# Patient Record
Sex: MALE | Race: WHITE | NOT HISPANIC OR LATINO
[De-identification: names, ages, dates, MRNs, and addresses within clinical notes are randomized per-mention and may not be internally consistent; named-entity substitution may affect disease eponyms.]

---

## 2017-05-09 ENCOUNTER — APPOINTMENT (OUTPATIENT)
Dept: PLASTIC SURGERY | Facility: CLINIC | Age: 77
End: 2017-05-09

## 2017-11-10 ENCOUNTER — OUTPATIENT (OUTPATIENT)
Dept: OUTPATIENT SERVICES | Facility: HOSPITAL | Age: 77
LOS: 1 days | Discharge: HOME | End: 2017-11-10

## 2017-11-10 DIAGNOSIS — D50.9 IRON DEFICIENCY ANEMIA, UNSPECIFIED: ICD-10-CM

## 2017-11-10 DIAGNOSIS — I25.10 ATHEROSCLEROTIC HEART DISEASE OF NATIVE CORONARY ARTERY WITHOUT ANGINA PECTORIS: ICD-10-CM

## 2017-11-10 DIAGNOSIS — Z79.899 OTHER LONG TERM (CURRENT) DRUG THERAPY: ICD-10-CM

## 2017-11-10 DIAGNOSIS — N39.0 URINARY TRACT INFECTION, SITE NOT SPECIFIED: ICD-10-CM

## 2017-11-10 DIAGNOSIS — E53.8 DEFICIENCY OF OTHER SPECIFIED B GROUP VITAMINS: ICD-10-CM

## 2017-11-10 DIAGNOSIS — E78.00 PURE HYPERCHOLESTEROLEMIA, UNSPECIFIED: ICD-10-CM

## 2017-11-10 DIAGNOSIS — D64.9 ANEMIA, UNSPECIFIED: ICD-10-CM

## 2017-11-10 DIAGNOSIS — E11.9 TYPE 2 DIABETES MELLITUS WITHOUT COMPLICATIONS: ICD-10-CM

## 2017-11-10 DIAGNOSIS — E78.5 HYPERLIPIDEMIA, UNSPECIFIED: ICD-10-CM

## 2017-11-10 DIAGNOSIS — Z01.810 ENCOUNTER FOR PREPROCEDURAL CARDIOVASCULAR EXAMINATION: ICD-10-CM

## 2018-04-17 ENCOUNTER — APPOINTMENT (OUTPATIENT)
Dept: UROLOGY | Facility: CLINIC | Age: 78
End: 2018-04-17
Payer: MEDICARE

## 2018-04-17 VITALS
SYSTOLIC BLOOD PRESSURE: 153 MMHG | BODY MASS INDEX: 25.18 KG/M2 | DIASTOLIC BLOOD PRESSURE: 49 MMHG | HEIGHT: 69 IN | WEIGHT: 170 LBS | HEART RATE: 49 BPM

## 2018-04-17 DIAGNOSIS — K22.70 BARRETT'S ESOPHAGUS W/OUT DYSPLASIA: ICD-10-CM

## 2018-04-17 DIAGNOSIS — Z87.891 PERSONAL HISTORY OF NICOTINE DEPENDENCE: ICD-10-CM

## 2018-04-17 DIAGNOSIS — Z78.9 OTHER SPECIFIED HEALTH STATUS: ICD-10-CM

## 2018-04-17 DIAGNOSIS — N13.8 BENIGN PROSTATIC HYPERPLASIA WITH LOWER URINARY TRACT SYMPMS: ICD-10-CM

## 2018-04-17 DIAGNOSIS — N20.0 CALCULUS OF KIDNEY: ICD-10-CM

## 2018-04-17 DIAGNOSIS — K21.9 GASTRO-ESOPHAGEAL REFLUX DISEASE W/OUT ESOPHAGITIS: ICD-10-CM

## 2018-04-17 DIAGNOSIS — N40.1 BENIGN PROSTATIC HYPERPLASIA WITH LOWER URINARY TRACT SYMPMS: ICD-10-CM

## 2018-04-17 LAB
BILIRUB UR QL STRIP: NORMAL
CLARITY UR: CLEAR
COLLECTION METHOD: NORMAL
GLUCOSE UR-MCNC: NORMAL
HCG UR QL: NORMAL EU/DL
HGB UR QL STRIP.AUTO: NORMAL
KETONES UR-MCNC: NORMAL
LEUKOCYTE ESTERASE UR QL STRIP: NORMAL
NITRITE UR QL STRIP: NORMAL
PH UR STRIP: 6
PROT UR STRIP-MCNC: NORMAL
SP GR UR STRIP: 1.01

## 2018-04-17 PROCEDURE — 81003 URINALYSIS AUTO W/O SCOPE: CPT | Mod: QW

## 2018-04-17 PROCEDURE — 99213 OFFICE O/P EST LOW 20 MIN: CPT

## 2018-05-08 ENCOUNTER — APPOINTMENT (OUTPATIENT)
Dept: PLASTIC SURGERY | Facility: CLINIC | Age: 78
End: 2018-05-08
Payer: MEDICARE

## 2018-05-08 DIAGNOSIS — C44.90 UNSPECIFIED MALIGNANT NEOPLASM OF SKIN, UNSPECIFIED: ICD-10-CM

## 2018-05-08 PROCEDURE — 99212 OFFICE O/P EST SF 10 MIN: CPT

## 2018-05-10 ENCOUNTER — OUTPATIENT (OUTPATIENT)
Dept: OUTPATIENT SERVICES | Facility: HOSPITAL | Age: 78
LOS: 1 days | Discharge: HOME | End: 2018-05-10

## 2018-05-10 DIAGNOSIS — D64.9 ANEMIA, UNSPECIFIED: ICD-10-CM

## 2018-05-10 DIAGNOSIS — I25.10 ATHEROSCLEROTIC HEART DISEASE OF NATIVE CORONARY ARTERY WITHOUT ANGINA PECTORIS: ICD-10-CM

## 2018-05-10 DIAGNOSIS — E11.9 TYPE 2 DIABETES MELLITUS WITHOUT COMPLICATIONS: ICD-10-CM

## 2018-05-10 DIAGNOSIS — D50.9 IRON DEFICIENCY ANEMIA, UNSPECIFIED: ICD-10-CM

## 2018-05-10 DIAGNOSIS — Z01.810 ENCOUNTER FOR PREPROCEDURAL CARDIOVASCULAR EXAMINATION: ICD-10-CM

## 2018-05-10 DIAGNOSIS — E53.8 DEFICIENCY OF OTHER SPECIFIED B GROUP VITAMINS: ICD-10-CM

## 2018-10-29 ENCOUNTER — OUTPATIENT (OUTPATIENT)
Dept: OUTPATIENT SERVICES | Facility: HOSPITAL | Age: 78
LOS: 1 days | Discharge: HOME | End: 2018-10-29

## 2018-10-29 DIAGNOSIS — D64.9 ANEMIA, UNSPECIFIED: ICD-10-CM

## 2018-10-29 DIAGNOSIS — E53.8 DEFICIENCY OF OTHER SPECIFIED B GROUP VITAMINS: ICD-10-CM

## 2018-10-29 DIAGNOSIS — R97.20 ELEVATED PROSTATE SPECIFIC ANTIGEN [PSA]: ICD-10-CM

## 2018-10-29 DIAGNOSIS — E05.90 THYROTOXICOSIS, UNSPECIFIED WITHOUT THYROTOXIC CRISIS OR STORM: ICD-10-CM

## 2018-10-29 DIAGNOSIS — N39.0 URINARY TRACT INFECTION, SITE NOT SPECIFIED: ICD-10-CM

## 2018-10-29 DIAGNOSIS — I25.10 ATHEROSCLEROTIC HEART DISEASE OF NATIVE CORONARY ARTERY WITHOUT ANGINA PECTORIS: ICD-10-CM

## 2018-10-29 DIAGNOSIS — E11.9 TYPE 2 DIABETES MELLITUS WITHOUT COMPLICATIONS: ICD-10-CM

## 2018-10-29 DIAGNOSIS — E78.00 PURE HYPERCHOLESTEROLEMIA, UNSPECIFIED: ICD-10-CM

## 2018-10-29 DIAGNOSIS — Z01.810 ENCOUNTER FOR PREPROCEDURAL CARDIOVASCULAR EXAMINATION: ICD-10-CM

## 2018-10-29 DIAGNOSIS — D50.9 IRON DEFICIENCY ANEMIA, UNSPECIFIED: ICD-10-CM

## 2018-10-29 DIAGNOSIS — Z79.899 OTHER LONG TERM (CURRENT) DRUG THERAPY: ICD-10-CM

## 2019-04-25 ENCOUNTER — OUTPATIENT (OUTPATIENT)
Dept: OUTPATIENT SERVICES | Facility: HOSPITAL | Age: 79
LOS: 1 days | Discharge: HOME | End: 2019-04-25

## 2019-04-25 DIAGNOSIS — D64.9 ANEMIA, UNSPECIFIED: ICD-10-CM

## 2019-04-25 DIAGNOSIS — D50.9 IRON DEFICIENCY ANEMIA, UNSPECIFIED: ICD-10-CM

## 2019-04-25 DIAGNOSIS — E11.9 TYPE 2 DIABETES MELLITUS WITHOUT COMPLICATIONS: ICD-10-CM

## 2019-04-25 DIAGNOSIS — I25.10 ATHEROSCLEROTIC HEART DISEASE OF NATIVE CORONARY ARTERY WITHOUT ANGINA PECTORIS: ICD-10-CM

## 2019-04-25 DIAGNOSIS — E05.90 THYROTOXICOSIS, UNSPECIFIED WITHOUT THYROTOXIC CRISIS OR STORM: ICD-10-CM

## 2019-04-25 DIAGNOSIS — Z01.810 ENCOUNTER FOR PREPROCEDURAL CARDIOVASCULAR EXAMINATION: ICD-10-CM

## 2019-04-25 DIAGNOSIS — E78.5 HYPERLIPIDEMIA, UNSPECIFIED: ICD-10-CM

## 2019-04-25 DIAGNOSIS — N39.0 URINARY TRACT INFECTION, SITE NOT SPECIFIED: ICD-10-CM

## 2019-04-25 DIAGNOSIS — E53.8 DEFICIENCY OF OTHER SPECIFIED B GROUP VITAMINS: ICD-10-CM

## 2019-04-29 DIAGNOSIS — I25.10 ATHEROSCLEROTIC HEART DISEASE OF NATIVE CORONARY ARTERY WITHOUT ANGINA PECTORIS: ICD-10-CM

## 2019-04-29 DIAGNOSIS — Z02.9 ENCOUNTER FOR ADMINISTRATIVE EXAMINATIONS, UNSPECIFIED: ICD-10-CM

## 2019-04-29 DIAGNOSIS — Z01.810 ENCOUNTER FOR PREPROCEDURAL CARDIOVASCULAR EXAMINATION: ICD-10-CM

## 2019-04-30 NOTE — ASU PATIENT PROFILE, ADULT - PMH
Acute glaucoma    Barretts esophagus    Basal cell carcinoma (BCC)    History of kidney stones    Well-controlled hypertension

## 2019-05-01 ENCOUNTER — OUTPATIENT (OUTPATIENT)
Dept: OUTPATIENT SERVICES | Facility: HOSPITAL | Age: 79
LOS: 1 days | Discharge: HOME | End: 2019-05-01

## 2019-05-01 VITALS
DIASTOLIC BLOOD PRESSURE: 70 MMHG | SYSTOLIC BLOOD PRESSURE: 162 MMHG | RESPIRATION RATE: 16 BRPM | WEIGHT: 169.98 LBS | HEART RATE: 51 BPM | OXYGEN SATURATION: 99 % | HEIGHT: 70 IN | TEMPERATURE: 96 F

## 2019-05-01 VITALS — RESPIRATION RATE: 18 BRPM | SYSTOLIC BLOOD PRESSURE: 161 MMHG | DIASTOLIC BLOOD PRESSURE: 76 MMHG | HEART RATE: 55 BPM

## 2019-05-01 DIAGNOSIS — Z98.890 OTHER SPECIFIED POSTPROCEDURAL STATES: Chronic | ICD-10-CM

## 2019-05-01 DIAGNOSIS — Z90.49 ACQUIRED ABSENCE OF OTHER SPECIFIED PARTS OF DIGESTIVE TRACT: Chronic | ICD-10-CM

## 2019-05-07 ENCOUNTER — APPOINTMENT (OUTPATIENT)
Dept: PLASTIC SURGERY | Facility: CLINIC | Age: 79
End: 2019-05-07

## 2019-05-10 DIAGNOSIS — H25.11 AGE-RELATED NUCLEAR CATARACT, RIGHT EYE: ICD-10-CM

## 2019-05-10 DIAGNOSIS — K21.9 GASTRO-ESOPHAGEAL REFLUX DISEASE WITHOUT ESOPHAGITIS: ICD-10-CM

## 2019-05-10 DIAGNOSIS — Z87.891 PERSONAL HISTORY OF NICOTINE DEPENDENCE: ICD-10-CM

## 2019-05-10 DIAGNOSIS — E78.5 HYPERLIPIDEMIA, UNSPECIFIED: ICD-10-CM

## 2019-05-10 DIAGNOSIS — H40.9 UNSPECIFIED GLAUCOMA: ICD-10-CM

## 2019-05-10 DIAGNOSIS — Z87.442 PERSONAL HISTORY OF URINARY CALCULI: ICD-10-CM

## 2019-05-10 DIAGNOSIS — Z87.11 PERSONAL HISTORY OF PEPTIC ULCER DISEASE: ICD-10-CM

## 2019-05-10 DIAGNOSIS — I10 ESSENTIAL (PRIMARY) HYPERTENSION: ICD-10-CM

## 2019-05-10 DIAGNOSIS — E83.52 HYPERCALCEMIA: ICD-10-CM

## 2019-05-10 DIAGNOSIS — K22.719 BARRETT'S ESOPHAGUS WITH DYSPLASIA, UNSPECIFIED: ICD-10-CM

## 2019-05-20 ENCOUNTER — APPOINTMENT (OUTPATIENT)
Dept: CARDIOLOGY | Facility: CLINIC | Age: 79
End: 2019-05-20
Payer: MEDICARE

## 2019-05-20 PROBLEM — H40.9 UNSPECIFIED GLAUCOMA: Chronic | Status: ACTIVE | Noted: 2019-05-01

## 2019-05-20 PROBLEM — Z87.442 PERSONAL HISTORY OF URINARY CALCULI: Chronic | Status: ACTIVE | Noted: 2019-05-01

## 2019-05-20 PROBLEM — I10 ESSENTIAL (PRIMARY) HYPERTENSION: Chronic | Status: ACTIVE | Noted: 2019-05-01

## 2019-05-20 PROBLEM — C44.91 BASAL CELL CARCINOMA OF SKIN, UNSPECIFIED: Chronic | Status: ACTIVE | Noted: 2019-05-01

## 2019-05-20 PROBLEM — K22.70 BARRETT'S ESOPHAGUS WITHOUT DYSPLASIA: Chronic | Status: ACTIVE | Noted: 2019-05-01

## 2019-05-20 PROCEDURE — 99214 OFFICE O/P EST MOD 30 MIN: CPT

## 2019-05-20 PROCEDURE — 93000 ELECTROCARDIOGRAM COMPLETE: CPT

## 2019-07-09 NOTE — ASU PATIENT PROFILE, ADULT - PMH
Acute glaucoma    Age-related cataract of both eyes    Barretts esophagus    Basal cell carcinoma (BCC)    History of kidney stones    HTN (hypertension)    Well-controlled hypertension

## 2019-07-10 ENCOUNTER — OUTPATIENT (OUTPATIENT)
Dept: OUTPATIENT SERVICES | Facility: HOSPITAL | Age: 79
LOS: 1 days | Discharge: HOME | End: 2019-07-10

## 2019-07-10 VITALS — HEART RATE: 48 BPM | SYSTOLIC BLOOD PRESSURE: 160 MMHG | RESPIRATION RATE: 15 BRPM | DIASTOLIC BLOOD PRESSURE: 68 MMHG

## 2019-07-10 VITALS
TEMPERATURE: 96 F | HEIGHT: 70 IN | HEART RATE: 47 BPM | RESPIRATION RATE: 18 BRPM | DIASTOLIC BLOOD PRESSURE: 67 MMHG | WEIGHT: 169.98 LBS | OXYGEN SATURATION: 96 % | SYSTOLIC BLOOD PRESSURE: 151 MMHG

## 2019-07-10 DIAGNOSIS — Z98.890 OTHER SPECIFIED POSTPROCEDURAL STATES: Chronic | ICD-10-CM

## 2019-07-10 DIAGNOSIS — Z90.49 ACQUIRED ABSENCE OF OTHER SPECIFIED PARTS OF DIGESTIVE TRACT: Chronic | ICD-10-CM

## 2019-07-10 RX ORDER — KETOROLAC TROMETHAMINE 0.5 %
1 DROPS OPHTHALMIC (EYE)
Qty: 0 | Refills: 0 | DISCHARGE

## 2019-07-10 RX ORDER — METOPROLOL TARTRATE 50 MG
0.5 TABLET ORAL
Qty: 0 | Refills: 0 | DISCHARGE

## 2019-07-10 RX ORDER — RANITIDINE HYDROCHLORIDE 150 MG/1
1 TABLET, FILM COATED ORAL
Qty: 0 | Refills: 0 | DISCHARGE

## 2019-07-10 RX ORDER — SIMVASTATIN 20 MG/1
1 TABLET, FILM COATED ORAL
Qty: 0 | Refills: 0 | DISCHARGE

## 2019-07-10 RX ORDER — VALSARTAN 80 MG/1
1 TABLET ORAL
Qty: 0 | Refills: 0 | DISCHARGE

## 2019-07-10 RX ORDER — LANSOPRAZOLE 15 MG/1
1 CAPSULE, DELAYED RELEASE ORAL
Qty: 0 | Refills: 0 | DISCHARGE

## 2019-07-10 RX ORDER — PREDNISOLONE SODIUM PHOSPHATE 1 %
1 DROPS OPHTHALMIC (EYE)
Qty: 0 | Refills: 0 | DISCHARGE

## 2019-07-10 RX ORDER — TIMOLOL 0.5 %
1 DROPS OPHTHALMIC (EYE)
Qty: 0 | Refills: 0 | DISCHARGE

## 2019-07-10 RX ORDER — LATANOPROST 0.05 MG/ML
1 SOLUTION/ DROPS OPHTHALMIC; TOPICAL
Qty: 0 | Refills: 0 | DISCHARGE

## 2019-07-10 NOTE — PRE-ANESTHESIA EVALUATION ADULT - NSANTHOSAYNRD_GEN_A_CORE
No. MELVIN screening performed.  STOP BANG Legend: 0-2 = LOW Risk; 3-4 = INTERMEDIATE Risk; 5-8 = HIGH Risk

## 2019-07-18 DIAGNOSIS — H25.812 COMBINED FORMS OF AGE-RELATED CATARACT, LEFT EYE: ICD-10-CM

## 2019-07-18 DIAGNOSIS — Z90.49 ACQUIRED ABSENCE OF OTHER SPECIFIED PARTS OF DIGESTIVE TRACT: ICD-10-CM

## 2019-07-18 DIAGNOSIS — C44.91 BASAL CELL CARCINOMA OF SKIN, UNSPECIFIED: ICD-10-CM

## 2019-07-18 DIAGNOSIS — K22.70 BARRETT'S ESOPHAGUS WITHOUT DYSPLASIA: ICD-10-CM

## 2019-07-18 DIAGNOSIS — I10 ESSENTIAL (PRIMARY) HYPERTENSION: ICD-10-CM

## 2019-07-18 DIAGNOSIS — E78.00 PURE HYPERCHOLESTEROLEMIA, UNSPECIFIED: ICD-10-CM

## 2019-07-18 DIAGNOSIS — Z98.890 OTHER SPECIFIED POSTPROCEDURAL STATES: ICD-10-CM

## 2019-07-18 DIAGNOSIS — H40.9 UNSPECIFIED GLAUCOMA: ICD-10-CM

## 2019-11-06 ENCOUNTER — OUTPATIENT (OUTPATIENT)
Dept: OUTPATIENT SERVICES | Facility: HOSPITAL | Age: 79
LOS: 1 days | Discharge: HOME | End: 2019-11-06

## 2019-11-06 DIAGNOSIS — Z98.890 OTHER SPECIFIED POSTPROCEDURAL STATES: Chronic | ICD-10-CM

## 2019-11-06 DIAGNOSIS — R97.20 ELEVATED PROSTATE SPECIFIC ANTIGEN [PSA]: ICD-10-CM

## 2019-11-06 DIAGNOSIS — E78.5 HYPERLIPIDEMIA, UNSPECIFIED: ICD-10-CM

## 2019-11-06 DIAGNOSIS — E53.8 DEFICIENCY OF OTHER SPECIFIED B GROUP VITAMINS: ICD-10-CM

## 2019-11-06 DIAGNOSIS — Z90.49 ACQUIRED ABSENCE OF OTHER SPECIFIED PARTS OF DIGESTIVE TRACT: Chronic | ICD-10-CM

## 2019-11-06 PROBLEM — I10 ESSENTIAL (PRIMARY) HYPERTENSION: Chronic | Status: ACTIVE | Noted: 2019-07-10

## 2019-11-06 PROBLEM — H25.9 UNSPECIFIED AGE-RELATED CATARACT: Chronic | Status: ACTIVE | Noted: 2019-07-10

## 2019-11-18 ENCOUNTER — APPOINTMENT (OUTPATIENT)
Dept: CARDIOLOGY | Facility: CLINIC | Age: 79
End: 2019-11-18
Payer: MEDICARE

## 2019-11-18 PROCEDURE — 93000 ELECTROCARDIOGRAM COMPLETE: CPT

## 2019-11-18 PROCEDURE — 99214 OFFICE O/P EST MOD 30 MIN: CPT

## 2020-07-15 DIAGNOSIS — Z86.39 PERSONAL HISTORY OF OTHER ENDOCRINE, NUTRITIONAL AND METABOLIC DISEASE: ICD-10-CM

## 2020-07-27 ENCOUNTER — APPOINTMENT (OUTPATIENT)
Dept: CARDIOLOGY | Facility: CLINIC | Age: 80
End: 2020-07-27
Payer: MEDICARE

## 2020-07-27 VITALS
TEMPERATURE: 98 F | WEIGHT: 177 LBS | BODY MASS INDEX: 26.22 KG/M2 | HEIGHT: 69 IN | SYSTOLIC BLOOD PRESSURE: 130 MMHG | HEART RATE: 59 BPM | DIASTOLIC BLOOD PRESSURE: 68 MMHG

## 2020-07-27 DIAGNOSIS — Z00.00 ENCOUNTER FOR GENERAL ADULT MEDICAL EXAMINATION W/OUT ABNORMAL FINDINGS: ICD-10-CM

## 2020-07-27 PROCEDURE — 99214 OFFICE O/P EST MOD 30 MIN: CPT

## 2020-07-27 PROCEDURE — 93000 ELECTROCARDIOGRAM COMPLETE: CPT

## 2020-07-27 NOTE — REVIEW OF SYSTEMS
[Negative] : Psychiatric [Shortness Of Breath] : no shortness of breath [Dyspnea on exertion] : not dyspnea during exertion [Chest Pain] : no chest pain [Lower Ext Edema] : no extremity edema [Chest  Pressure] : no chest pressure [Leg Claudication] : no intermittent leg claudication [Palpitations] : no palpitations

## 2020-07-27 NOTE — DISCUSSION/SUMMARY
[Coronary Artery Disease] : coronary artery disease [Stable] : stable [Dietary Modification] : dietary modification [None] : none [Patient] : the patient [FreeTextEntry1] : Patient is advised to maintain his present medications. he was advised to restart an exercise regimen.Repeat lab testing in 6 months

## 2020-07-27 NOTE — PHYSICAL EXAM
[General Appearance - Well Developed] : well developed [Normal Appearance] : normal appearance [General Appearance - Well Nourished] : well nourished [Normal Jugular Venous A Waves Present] : normal jugular venous A waves present [Normal Oral Mucosa] : normal oral mucosa [] : no respiratory distress [Heart Sounds] : normal S1 and S2 [Heart Rate And Rhythm] : heart rate and rhythm were normal [Edema] : no peripheral edema present [Arterial Pulses Normal] : the arterial pulses were normal [Abdomen Soft] : soft [Bowel Sounds] : normal bowel sounds [Abdomen Tenderness] : non-tender [Abnormal Walk] : normal gait [Cyanosis, Localized] : no localized cyanosis [Petechial Hemorrhages (___cm)] : no petechial hemorrhages [Nail Clubbing] : no clubbing of the fingernails [Oriented To Time, Place, And Person] : oriented to person, place, and time [Impaired Insight] : insight and judgment were intact

## 2020-12-19 NOTE — ASU PREOP CHECKLIST - HEART RATE (BEATS/MIN)
Advised patient on ASHLIE Read's information and recommendations. Medication reviewed. Patient verbalized understanding. 47

## 2021-04-12 ENCOUNTER — APPOINTMENT (OUTPATIENT)
Dept: CARDIOLOGY | Facility: CLINIC | Age: 81
End: 2021-04-12
Payer: MEDICARE

## 2021-04-12 ENCOUNTER — RESULT CHARGE (OUTPATIENT)
Age: 81
End: 2021-04-12

## 2021-04-12 VITALS
TEMPERATURE: 98 F | HEIGHT: 69 IN | SYSTOLIC BLOOD PRESSURE: 170 MMHG | BODY MASS INDEX: 26.96 KG/M2 | HEART RATE: 53 BPM | WEIGHT: 182 LBS | DIASTOLIC BLOOD PRESSURE: 60 MMHG

## 2021-04-12 PROCEDURE — 99214 OFFICE O/P EST MOD 30 MIN: CPT

## 2021-04-12 PROCEDURE — 99072 ADDL SUPL MATRL&STAF TM PHE: CPT

## 2021-04-12 PROCEDURE — 93000 ELECTROCARDIOGRAM COMPLETE: CPT

## 2021-04-12 RX ORDER — RANITIDINE 150 MG/1
150 TABLET ORAL
Refills: 0 | Status: DISCONTINUED | COMMUNITY
End: 2021-04-12

## 2021-04-12 RX ORDER — FAMOTIDINE 40 MG/1
40 TABLET, FILM COATED ORAL
Refills: 0 | Status: DISCONTINUED | COMMUNITY
End: 2021-04-12

## 2021-04-12 RX ORDER — LANSOPRAZOLE 30 MG/1
30 CAPSULE, DELAYED RELEASE ORAL DAILY
Refills: 0 | Status: DISCONTINUED | COMMUNITY
End: 2021-04-12

## 2021-04-12 RX ORDER — B12/LEVOMEFOLATE CALCIUM/B-6 2-1.13-25
TABLET ORAL DAILY
Refills: 0 | Status: DISCONTINUED | COMMUNITY
End: 2021-04-12

## 2021-04-12 NOTE — DISCUSSION/SUMMARY
[Coronary Artery Disease] : coronary artery disease [Stable] : stable [None] : none [Dietary Modification] : dietary modification [Patient] : the patient [FreeTextEntry1] : Amlodipine will be increased to 5 mg QD Patient is advised to maintain his remaining medications. \par He was advised to restart an exercise regimen.Repeat office visit in 4 weeks.\par Patient will keep a diary of his BP readings.

## 2021-04-12 NOTE — HISTORY OF PRESENT ILLNESS
[FreeTextEntry1] : Hypertensive heart disease\par Hyperlipidemia\par Obesity has improved with a 50 lb. weight loss\par DM

## 2021-04-12 NOTE — PHYSICAL EXAM
[General Appearance - Well Developed] : well developed [Normal Appearance] : normal appearance [General Appearance - Well Nourished] : well nourished [Normal Oral Mucosa] : normal oral mucosa [Normal Jugular Venous A Waves Present] : normal jugular venous A waves present [] : no respiratory distress [Heart Rate And Rhythm] : heart rate and rhythm were normal [Heart Sounds] : normal S1 and S2 [Arterial Pulses Normal] : the arterial pulses were normal [Edema] : no peripheral edema present [Bowel Sounds] : normal bowel sounds [Abdomen Soft] : soft [Abdomen Tenderness] : non-tender [Abnormal Walk] : normal gait [Nail Clubbing] : no clubbing of the fingernails [Cyanosis, Localized] : no localized cyanosis [Petechial Hemorrhages (___cm)] : no petechial hemorrhages [Oriented To Time, Place, And Person] : oriented to person, place, and time [Impaired Insight] : insight and judgment were intact

## 2021-05-10 ENCOUNTER — APPOINTMENT (OUTPATIENT)
Dept: CARDIOLOGY | Facility: CLINIC | Age: 81
End: 2021-05-10
Payer: MEDICARE

## 2021-05-10 VITALS
BODY MASS INDEX: 26.81 KG/M2 | TEMPERATURE: 98.7 F | HEIGHT: 69 IN | WEIGHT: 181 LBS | SYSTOLIC BLOOD PRESSURE: 130 MMHG | DIASTOLIC BLOOD PRESSURE: 65 MMHG

## 2021-05-10 PROCEDURE — 99072 ADDL SUPL MATRL&STAF TM PHE: CPT

## 2021-05-10 PROCEDURE — 99213 OFFICE O/P EST LOW 20 MIN: CPT

## 2021-05-10 RX ORDER — AMLODIPINE BESYLATE 2.5 MG/1
2.5 TABLET ORAL DAILY
Qty: 90 | Refills: 3 | Status: DISCONTINUED | COMMUNITY
End: 2021-05-10

## 2021-05-10 NOTE — DISCUSSION/SUMMARY
[Coronary Artery Disease] : coronary artery disease [Stable] : stable [Dietary Modification] : dietary modification [FreeTextEntry1] : Amlodipine will be continued at  5 mg QD  Patient is advised to maintain his remaining medications. \par He was advised to restart an exercise regimen.\par Patient will keep a diary of his BP readings.\par He was advised to call me if his BP readings were to increase.\par RV in 6 months

## 2021-05-10 NOTE — REASON FOR VISIT
[FreeTextEntry1] : Patient presents for follow up after having his amlodipine increased due to hypertensive readings. His BP is now better controlled and his levels measured at home are predominately under 140/90 mmHg.

## 2021-11-08 ENCOUNTER — APPOINTMENT (OUTPATIENT)
Dept: CARDIOLOGY | Facility: CLINIC | Age: 81
End: 2021-11-08
Payer: MEDICARE

## 2021-11-08 VITALS
HEIGHT: 69 IN | HEART RATE: 51 BPM | DIASTOLIC BLOOD PRESSURE: 70 MMHG | SYSTOLIC BLOOD PRESSURE: 140 MMHG | WEIGHT: 180 LBS | TEMPERATURE: 97.2 F | BODY MASS INDEX: 26.66 KG/M2

## 2021-11-08 PROCEDURE — 93000 ELECTROCARDIOGRAM COMPLETE: CPT

## 2021-11-08 PROCEDURE — 99214 OFFICE O/P EST MOD 30 MIN: CPT

## 2021-11-08 RX ORDER — CLOBETASOL PROPIONATE 0.5 MG/G
0.05 CREAM TOPICAL
Qty: 60 | Refills: 0 | Status: DISCONTINUED | COMMUNITY
Start: 2020-10-15 | End: 2021-11-08

## 2021-11-08 RX ORDER — FUROSEMIDE 20 MG/1
20 TABLET ORAL
Qty: 5 | Refills: 0 | Status: DISCONTINUED | COMMUNITY
Start: 2020-10-15 | End: 2021-11-08

## 2021-11-08 RX ORDER — TRETINOIN 0.5 MG/G
0.05 CREAM TOPICAL
Qty: 20 | Refills: 0 | Status: DISCONTINUED | COMMUNITY
Start: 2020-10-26 | End: 2021-11-08

## 2021-11-08 NOTE — PHYSICAL EXAM
[General Appearance - Well Developed] : well developed [Normal Appearance] : normal appearance [General Appearance - Well Nourished] : well nourished [Normal Oral Mucosa] : normal oral mucosa [Normal Jugular Venous A Waves Present] : normal jugular venous A waves present [] : no respiratory distress [Heart Rate And Rhythm] : heart rate and rhythm were normal [Heart Sounds] : normal S1 and S2 [Arterial Pulses Normal] : the arterial pulses were normal [Edema] : no peripheral edema present [Bowel Sounds] : normal bowel sounds [Abdomen Tenderness] : non-tender [Abdomen Soft] : soft [Abnormal Walk] : normal gait [Nail Clubbing] : no clubbing of the fingernails [Cyanosis, Localized] : no localized cyanosis [Petechial Hemorrhages (___cm)] : no petechial hemorrhages [Oriented To Time, Place, And Person] : oriented to person, place, and time [Impaired Insight] : insight and judgment were intact

## 2021-11-08 NOTE — DISCUSSION/SUMMARY
[Coronary Artery Disease] : coronary artery disease [Stable] : stable [Dietary Modification] : dietary modification [FreeTextEntry1] : Amlodipine will be continued at  5 mg QD  Patient is advised to maintain his remaining medications. \par He was advised to restart an exercise regimen.\par Patient will keep a diary of his BP readings. His BP has been well controlled.\par A carotid duplex doppler was performed by his Internist with no significant carotid disease based on the patient's report.\par He was advised to call me if his BP readings were to increase.\par RV in 6 months

## 2021-11-30 ENCOUNTER — APPOINTMENT (OUTPATIENT)
Dept: HEMATOLOGY ONCOLOGY | Facility: CLINIC | Age: 81
End: 2021-11-30
Payer: MEDICARE

## 2021-11-30 ENCOUNTER — LABORATORY RESULT (OUTPATIENT)
Age: 81
End: 2021-11-30

## 2021-11-30 VITALS
HEIGHT: 69 IN | TEMPERATURE: 97.6 F | HEART RATE: 73 BPM | WEIGHT: 180 LBS | DIASTOLIC BLOOD PRESSURE: 77 MMHG | BODY MASS INDEX: 26.66 KG/M2 | SYSTOLIC BLOOD PRESSURE: 186 MMHG

## 2021-11-30 LAB
HCT VFR BLD CALC: 43.5 %
HGB BLD-MCNC: 14.9 G/DL
MCHC RBC-ENTMCNC: 33 PG
MCHC RBC-ENTMCNC: 34.3 G/DL
MCV RBC AUTO: 96.2 FL
PLATELET # BLD AUTO: 230 K/UL
PMV BLD: 9.8 FL
RBC # BLD: 4.52 M/UL
RBC # FLD: 12.5 %
WBC # FLD AUTO: 6.55 K/UL

## 2021-11-30 PROCEDURE — 99205 OFFICE O/P NEW HI 60 MIN: CPT

## 2021-11-30 NOTE — REVIEW OF SYSTEMS
[FreeTextEntry4] : s [Fever] : no fever [Night Sweats] : no night sweats [Recent Change In Weight] : ~T recent weight change [Loss of Hearing] : loss of hearing [Chest Pain] : no chest pain [Easy Bleeding] : no tendency for easy bleeding [Negative] : Allergic/Immunologic [FreeTextEntry2] : lost weight intentionally mostly by adjusting diet over the years ; appetite is good [de-identified] : reports pruritus of back, follows with DERM + applying cream

## 2021-11-30 NOTE — REVIEW OF SYSTEMS
[FreeTextEntry4] : s [Fever] : no fever [Night Sweats] : no night sweats [Recent Change In Weight] : ~T recent weight change [Loss of Hearing] : loss of hearing [Chest Pain] : no chest pain [Easy Bleeding] : no tendency for easy bleeding [Negative] : Allergic/Immunologic [FreeTextEntry2] : lost weight intentionally mostly by adjusting diet over the years ; appetite is good [de-identified] : reports pruritus of back, follows with DERM + applying cream

## 2021-11-30 NOTE — HISTORY OF PRESENT ILLNESS
[de-identified] : Mr. EJ GALLOWAY is a 81 year old male here today for evaluation and management of hypercalcemia.  \par \par He was referred by Dr. Hugh PATEL.  EJ is a 81 year old M with PMHx including GERD, Hernandez's esophagus, hyperlipidemia, HTN, LBBB, basal cell carcinoma (s/p removal) and nephrolithiasis who presents to clinic to establish care.   He is presently feeling well with no new complaints.  Patient denies fever, chills, nausea, night sweats, dyspnea, unintentional weight loss, muscle spasms, new rash or bleeding.  Patient reports no pertinent personal or family history of malignancy or blood/clotting disorder.  He had parathyroidectomy performed back in 2005.  He notes he has had hypercalcemia since at least 2002 ever since he was in the [facility] monitoring study.  \par \par RADIOLOGIC WORKUP\par CT Chest (9.8.2015) Impression: Stable 2 mm right lower lobe pulmonary nodule.  No new pulmonary nodule.Stable irregularly bordered sessile lesion in the upper intrathoracic trachea on the left in size and appearance.  The differential diagnosis is broad, including benign and malignant etiologies, though stability favors an underlying benign pathology.  Tracheoscopy could be performed for definitive diagnosis as clinically directed.Small left-sided paratracheal lipoma, unchanged.\par PET/CT (5.15.2021 - [facility]) IMPRESSION:1.  No evidence of FDG avid lesion to suggest malignancy/metastatic disease. 2.  Non-FDG avid 1 cm left upper lobe nodules and 8 mm left lower lobe nodule, likely post inflammatory.  A short-term follow-up diagnostic CT scan in 3 months is recommended to determine stability of the lesions.3.  Previously seen 5 mm pleural-based nodule in the posterior basal segment of the left lower lobe has completely resolved. \par CT Chest (8.16.2021 - [facility]) IMPRESSION: Pulmonary finding stable except for a new 2 mm nodule left upper lobe image 25. It is likely postinflammatory. Tracheal soft tissue stable. 1 cm nodule left apex is stable. The 2 nodules in the left lower lobe are also stable. No significant adenopathy. Since stability has been noted from 4/24 i.e. over almost 4 months and as PET/CT is negative another follow-up at a 8 month interval recommended\par \par LAB WORKUP\par (10.6.2021) WBC 7.77, Hgb 14.8, MCV 97.4, , Cr 0.8, Calcium 11.2\par (6.10.2020) WBC 6.06, Hgb 15, MCV 95.8, , Calcium 10.4\par (5.10.2018) Calcium 10.8\par (11.10.2017) WBC 6.56, Hgb 14.6, MCV 99.1, , Calcium 10.8\par \par HCM\par Colonoscopy done in 2019, removed polyps but reportedly benign\par Upper Endoscopy done in 2018, reportedly normal\par COVID Vaccinated (Moderna x2)

## 2021-11-30 NOTE — PHYSICAL EXAM
[Ambulatory and capable of all self care but unable to carry out any work activities] : Status 2- Ambulatory and capable of all self care but unable to carry out any work activities. Up and about more than 50% of waking hours [Normal] : affect appropriate [de-identified] : slightly [place]

## 2021-11-30 NOTE — HISTORY OF PRESENT ILLNESS
[de-identified] : Mr. EJ GALLOWAY is a 81 year old male here today for evaluation and management of hypercalcemia.  \par \par He was referred by Dr. Hugh PATEL.  EJ is a 81 year old M with PMHx including GERD, Hernandez's esophagus, hyperlipidemia, HTN, LBBB, basal cell carcinoma (s/p removal) and nephrolithiasis who presents to clinic to establish care.   He is presently feeling well with no new complaints.  Patient denies fever, chills, nausea, night sweats, dyspnea, unintentional weight loss, muscle spasms, new rash or bleeding.  Patient reports no pertinent personal or family history of malignancy or blood/clotting disorder.  He had parathyroidectomy performed back in 2005.  He notes he has had hypercalcemia since at least 2002 ever since he was in the [facility] monitoring study.  \par \par RADIOLOGIC WORKUP\par CT Chest (9.8.2015) Impression: Stable 2 mm right lower lobe pulmonary nodule.  No new pulmonary nodule.Stable irregularly bordered sessile lesion in the upper intrathoracic trachea on the left in size and appearance.  The differential diagnosis is broad, including benign and malignant etiologies, though stability favors an underlying benign pathology.  Tracheoscopy could be performed for definitive diagnosis as clinically directed.Small left-sided paratracheal lipoma, unchanged.\par PET/CT (5.15.2021 - [facility]) IMPRESSION:1.  No evidence of FDG avid lesion to suggest malignancy/metastatic disease. 2.  Non-FDG avid 1 cm left upper lobe nodules and 8 mm left lower lobe nodule, likely post inflammatory.  A short-term follow-up diagnostic CT scan in 3 months is recommended to determine stability of the lesions.3.  Previously seen 5 mm pleural-based nodule in the posterior basal segment of the left lower lobe has completely resolved. \par CT Chest (8.16.2021 - [facility]) IMPRESSION: Pulmonary finding stable except for a new 2 mm nodule left upper lobe image 25. It is likely postinflammatory. Tracheal soft tissue stable. 1 cm nodule left apex is stable. The 2 nodules in the left lower lobe are also stable. No significant adenopathy. Since stability has been noted from 4/24 i.e. over almost 4 months and as PET/CT is negative another follow-up at a 8 month interval recommended\par \par LAB WORKUP\par (10.6.2021) WBC 7.77, Hgb 14.8, MCV 97.4, , Cr 0.8, Calcium 11.2\par (6.10.2020) WBC 6.06, Hgb 15, MCV 95.8, , Calcium 10.4\par (5.10.2018) Calcium 10.8\par (11.10.2017) WBC 6.56, Hgb 14.6, MCV 99.1, , Calcium 10.8\par \par HCM\par Colonoscopy done in 2019, removed polyps but reportedly benign\par Upper Endoscopy done in 2018, reportedly normal\par COVID Vaccinated (Moderna x2)

## 2021-11-30 NOTE — PHYSICAL EXAM
[Ambulatory and capable of all self care but unable to carry out any work activities] : Status 2- Ambulatory and capable of all self care but unable to carry out any work activities. Up and about more than 50% of waking hours [Normal] : affect appropriate [de-identified] : slightly [place]

## 2021-11-30 NOTE — ASSESSMENT
[FreeTextEntry1] : 82 YO man with # Hypercalcemia; as per pt, it has been stable since 2002\par  asymptomatic\par - reviewed radiologic and lab workup and had a discussion regarding possible differential diagnoses (lymphoma vs sarcoid) ; PET/CT 2021 and CT CHEST 2021 reviewed and do not show evidence of suspicious adenopathy; \par - will consider reordering imaging to be done at an 8 month interval ( ~04/2022)\par - CBC, BMP, LFTs, LDH, uric acid, phos, PTHrp today\par - will need to discuss with referring endocrinologist Dr Robin Reese\par \par Followup with DERM as scheduled for management of back pruritus and hx of skin cancer.    \par \par RTC in 1 week

## 2021-11-30 NOTE — CONSULT LETTER
[Dear  ___] : Dear  [unfilled], [Consult Letter:] : I had the pleasure of evaluating your patient, [unfilled]. [Please see my note below.] : Please see my note below. [Sincerely,] : Sincerely, [FreeTextEntry3] : Jacques Walters

## 2021-12-01 LAB
ANION GAP SERPL CALC-SCNC: 15 MMOL/L
BUN SERPL-MCNC: 16 MG/DL
CALCIUM SERPL-MCNC: 10.9 MG/DL
CHLORIDE SERPL-SCNC: 103 MMOL/L
CO2 SERPL-SCNC: 22 MMOL/L
CREAT SERPL-MCNC: 0.8 MG/DL
CRP SERPL-MCNC: <3 MG/L
ERYTHROCYTE [SEDIMENTATION RATE] IN BLOOD BY WESTERGREN METHOD: 25 MM/HR
GLUCOSE SERPL-MCNC: 85 MG/DL
LDH SERPL-CCNC: 235 U/L
PHOSPHATE SERPL-MCNC: 3.3 MG/DL
POTASSIUM SERPL-SCNC: 4.3 MMOL/L
SODIUM SERPL-SCNC: 140 MMOL/L
URATE SERPL-MCNC: 5.7 MG/DL

## 2021-12-06 LAB — PTH RELATED PROT SERPL-MCNC: <2 PMOL/L

## 2021-12-10 ENCOUNTER — OUTPATIENT (OUTPATIENT)
Dept: OUTPATIENT SERVICES | Facility: HOSPITAL | Age: 81
LOS: 1 days | Discharge: HOME | End: 2021-12-10

## 2021-12-10 ENCOUNTER — APPOINTMENT (OUTPATIENT)
Dept: HEMATOLOGY ONCOLOGY | Facility: CLINIC | Age: 81
End: 2021-12-10
Payer: MEDICARE

## 2021-12-10 VITALS
HEIGHT: 69 IN | HEART RATE: 61 BPM | WEIGHT: 180 LBS | OXYGEN SATURATION: 98 % | TEMPERATURE: 97.2 F | DIASTOLIC BLOOD PRESSURE: 77 MMHG | SYSTOLIC BLOOD PRESSURE: 182 MMHG | RESPIRATION RATE: 14 BRPM | BODY MASS INDEX: 26.66 KG/M2

## 2021-12-10 DIAGNOSIS — Z98.890 OTHER SPECIFIED POSTPROCEDURAL STATES: Chronic | ICD-10-CM

## 2021-12-10 DIAGNOSIS — E83.52 HYPERCALCEMIA: ICD-10-CM

## 2021-12-10 DIAGNOSIS — Z90.49 ACQUIRED ABSENCE OF OTHER SPECIFIED PARTS OF DIGESTIVE TRACT: Chronic | ICD-10-CM

## 2021-12-10 PROCEDURE — 99215 OFFICE O/P EST HI 40 MIN: CPT

## 2021-12-10 NOTE — REVIEW OF SYSTEMS
[Recent Change In Weight] : ~T recent weight change [Loss of Hearing] : loss of hearing [Negative] : Allergic/Immunologic [Fever] : no fever [Night Sweats] : no night sweats [Chest Pain] : no chest pain [Easy Bleeding] : no tendency for easy bleeding [FreeTextEntry2] : lost weight intentionally mostly by adjusting diet over the years ; appetite is good [de-identified] : reports pruritus of back, follows with DERM + applying cream

## 2021-12-10 NOTE — PHYSICAL EXAM
[Ambulatory and capable of all self care but unable to carry out any work activities] : Status 2- Ambulatory and capable of all self care but unable to carry out any work activities. Up and about more than 50% of waking hours [Normal] : affect appropriate [de-identified] : slightly [place]

## 2021-12-10 NOTE — CONSULT LETTER
[Dear  ___] : Dear  [unfilled], [Consult Letter:] : I had the pleasure of evaluating your patient, [unfilled]. [Please see my note below.] : Please see my note below. [Sincerely,] : Sincerely, [DrAlicia  ___] : Dr. GUNTER [FreeTextEntry3] : Jacques Walters

## 2021-12-10 NOTE — ASSESSMENT
[FreeTextEntry1] : 82 YO man with # Hypercalcemia; as per pt, it has been stable since 2002\par  asymptomatic\par - reviewed radiologic and lab workup and had a discussion regarding possible differential diagnoses (lymphoma vs sarcoid) ; PET/CT 2021 and CT CHEST 2021 reviewed and do not show evidence of suspicious adenopathy; \par - reimaging recommended to be done at an 8 month interval ( ~04/2022) ; he has CT scans with IVC scheduled for April + PET/CT in August 2022 ; not sure that he will need both of these scans for followup \par - PTHrp WNL from 09/2021 + 11/2021\par - discussed with referring endocrinologist, Dr. Robin Reese, and hypercalcemia is stable over years and not causing any new/worrisome symptoms at this time \par \par Followup with DERM as scheduled for management of back pruritus and hx of skin cancer.    \par \par Explained that he can likely follow with PCP for close monitoring, as long as they are comfortable, and return to clinic as needed for any hematologic/oncologic changes of concern. \par \par seen/examined w/ NP C.Smart\par 1. hypercalcemia is chronic, mild, asymptomatic. PTH is normal; PTHrp is normal. PET/CT and CT chest no evidence of adenopathy or organomegaly r/o NHL/HL; sarcoidosis is unlikely; SPEP/SIF is normal; deramatology exam is normal excluding musosis fungoides as a cause of hypercalcemia: WOULD NOT RECOMMEND FURTHER W/U\par 2. pt is scheduled for both PET/CT and CT w/ IVC: PLEASE EVALUATE IF THIS IS NECESSARY TO HAVE BOTH studies\par RTC PRN

## 2021-12-10 NOTE — HISTORY OF PRESENT ILLNESS
[de-identified] : Mr. EJ GALLOWAY is a 81 year old male here today for evaluation and management of hypercalcemia.  \par \par He was referred by Dr. Hugh PATEL.  EJ is a 81 year old M with PMHx including GERD, Hernandez's esophagus, hyperlipidemia, HTN, LBBB, basal cell carcinoma (s/p removal) and nephrolithiasis who presents to clinic to establish care.   He is presently feeling well with no new complaints.  Patient denies fever, chills, nausea, night sweats, dyspnea, unintentional weight loss, muscle spasms, new rash or bleeding.  Patient reports no pertinent personal or family history of malignancy or blood/clotting disorder.  He had parathyroidectomy performed back in 2005.  He notes he has had hypercalcemia since at least 2002 ever since he was in the [facility] monitoring study.  \par \par RADIOLOGIC WORKUP\par CT Chest (9.8.2015) Impression: Stable 2 mm right lower lobe pulmonary nodule.  No new pulmonary nodule.Stable irregularly bordered sessile lesion in the upper intrathoracic trachea on the left in size and appearance.  The differential diagnosis is broad, including benign and malignant etiologies, though stability favors an underlying benign pathology.  Tracheoscopy could be performed for definitive diagnosis as clinically directed.Small left-sided paratracheal lipoma, unchanged.\par PET/CT (5.15.2021 - [facility]) IMPRESSION:1.  No evidence of FDG avid lesion to suggest malignancy/metastatic disease. 2.  Non-FDG avid 1 cm left upper lobe nodules and 8 mm left lower lobe nodule, likely post inflammatory.  A short-term follow-up diagnostic CT scan in 3 months is recommended to determine stability of the lesions.3.  Previously seen 5 mm pleural-based nodule in the posterior basal segment of the left lower lobe has completely resolved. \par CT Chest (8.16.2021 - [facility]) IMPRESSION: Pulmonary finding stable except for a new 2 mm nodule left upper lobe image 25. It is likely postinflammatory. Tracheal soft tissue stable. 1 cm nodule left apex is stable. The 2 nodules in the left lower lobe are also stable. No significant adenopathy. Since stability has been noted from 4/24 i.e. over almost 4 months and as PET/CT is negative another follow-up at a 8 month interval recommended\par \par LAB WORKUP\par (10.6.2021) WBC 7.77, Hgb 14.8, MCV 97.4, , Cr 0.8, Calcium 11.2\par (6.10.2020) WBC 6.06, Hgb 15, MCV 95.8, , Calcium 10.4\par (5.10.2018) Calcium 10.8\par (11.10.2017) WBC 6.56, Hgb 14.6, MCV 99.1, , Calcium 10.8\par \par HCM\par Colonoscopy done in 2019, removed polyps but reportedly benign\par Upper Endoscopy done in 2018, reportedly normal\par COVID Vaccinated (Moderna x2) [de-identified] : 12/10/21\par Patient is here for a follow-up visit for hypercalcemia.  He is feeling well with no new complaints.  Reviewed most recent CBC, which is stable.  Patient denies fever, chills, nausea, vomiting, dyspnea or bleeding.  Reviewed outside lab workup and most recent workup which is essentially unrevealing but stable hypercalcemia at 10.9mg/dL from last month.  \par LABWORK (9.25.2021 - [facility]): A faint band in lambda is present against a dense polyclonal background.  Normal immunofixation.  No monoclonal protein bands.

## 2022-01-18 NOTE — ASU PATIENT PROFILE, ADULT - FALLEN IN THE PAST
[Abdomen Masses] : No abdominal masses [Tender] : nontender [Excoriation] : no perianal excoriation [Tender, Swollen] : tender, swollen [Normal] : was normal [None] : there was no rectal mass  [Gross Blood] : no gross blood [Normal Breath Sounds] : Normal breath sounds [Normal Rate and Rhythm] : normal rate and rhythm [No Rash or Lesion] : No rash or lesion [Alert] : alert [Oriented to Person] : oriented to person [Oriented to Place] : oriented to place [Oriented to Time] : oriented to time [Calm] : calm [de-identified] : Normal prostate [de-identified] : Looks well in no distress, of stated age. [de-identified] : pupils equal reactive to light normocephalic atraumatic. [de-identified] : moves all 4 extremities appropriately with 5 over 5 strength no

## 2022-04-07 ENCOUNTER — OUTPATIENT (OUTPATIENT)
Dept: OUTPATIENT SERVICES | Facility: HOSPITAL | Age: 82
LOS: 1 days | Discharge: HOME | End: 2022-04-07

## 2022-04-07 DIAGNOSIS — Z98.890 OTHER SPECIFIED POSTPROCEDURAL STATES: Chronic | ICD-10-CM

## 2022-04-07 DIAGNOSIS — Z90.49 ACQUIRED ABSENCE OF OTHER SPECIFIED PARTS OF DIGESTIVE TRACT: Chronic | ICD-10-CM

## 2022-04-11 DIAGNOSIS — Z13.820 ENCOUNTER FOR SCREENING FOR OSTEOPOROSIS: ICD-10-CM

## 2022-04-11 DIAGNOSIS — M89.9 DISORDER OF BONE, UNSPECIFIED: ICD-10-CM

## 2022-05-09 ENCOUNTER — APPOINTMENT (OUTPATIENT)
Dept: CARDIOLOGY | Facility: CLINIC | Age: 82
End: 2022-05-09
Payer: MEDICARE

## 2022-05-09 VITALS
WEIGHT: 181 LBS | TEMPERATURE: 98.2 F | DIASTOLIC BLOOD PRESSURE: 72 MMHG | HEART RATE: 44 BPM | HEIGHT: 69 IN | BODY MASS INDEX: 26.81 KG/M2 | SYSTOLIC BLOOD PRESSURE: 162 MMHG

## 2022-05-09 PROCEDURE — 99214 OFFICE O/P EST MOD 30 MIN: CPT

## 2022-05-09 PROCEDURE — 93000 ELECTROCARDIOGRAM COMPLETE: CPT

## 2022-05-09 RX ORDER — TRIAMCINOLONE ACETONIDE 1 MG/G
0.1 CREAM TOPICAL
Qty: 454 | Refills: 0 | Status: DISCONTINUED | COMMUNITY
Start: 2020-12-11 | End: 2022-05-09

## 2022-11-07 ENCOUNTER — INPATIENT (INPATIENT)
Facility: HOSPITAL | Age: 82
LOS: 1 days | Discharge: HOME HEALTH | End: 2022-11-09
Attending: STUDENT IN AN ORGANIZED HEALTH CARE EDUCATION/TRAINING PROGRAM | Admitting: STUDENT IN AN ORGANIZED HEALTH CARE EDUCATION/TRAINING PROGRAM

## 2022-11-07 ENCOUNTER — APPOINTMENT (OUTPATIENT)
Dept: CARDIOLOGY | Facility: CLINIC | Age: 82
End: 2022-11-07

## 2022-11-07 VITALS — RESPIRATION RATE: 18 BRPM | HEART RATE: 32 BPM | WEIGHT: 195.11 LBS | TEMPERATURE: 98 F | OXYGEN SATURATION: 99 %

## 2022-11-07 VITALS
HEART RATE: 35 BPM | WEIGHT: 180 LBS | HEIGHT: 69 IN | SYSTOLIC BLOOD PRESSURE: 150 MMHG | BODY MASS INDEX: 26.66 KG/M2 | DIASTOLIC BLOOD PRESSURE: 60 MMHG

## 2022-11-07 DIAGNOSIS — R00.1 BRADYCARDIA, UNSPECIFIED: ICD-10-CM

## 2022-11-07 DIAGNOSIS — Z98.890 OTHER SPECIFIED POSTPROCEDURAL STATES: Chronic | ICD-10-CM

## 2022-11-07 DIAGNOSIS — I45.9 CONDUCTION DISORDER, UNSPECIFIED: ICD-10-CM

## 2022-11-07 DIAGNOSIS — C44.91 BASAL CELL CARCINOMA OF SKIN, UNSPECIFIED: ICD-10-CM

## 2022-11-07 DIAGNOSIS — I44.2 ATRIOVENTRICULAR BLOCK, COMPLETE: ICD-10-CM

## 2022-11-07 DIAGNOSIS — E78.5 HYPERLIPIDEMIA, UNSPECIFIED: ICD-10-CM

## 2022-11-07 DIAGNOSIS — H40.9 UNSPECIFIED GLAUCOMA: ICD-10-CM

## 2022-11-07 DIAGNOSIS — K22.70 BARRETT'S ESOPHAGUS WITHOUT DYSPLASIA: ICD-10-CM

## 2022-11-07 DIAGNOSIS — I10 ESSENTIAL (PRIMARY) HYPERTENSION: ICD-10-CM

## 2022-11-07 DIAGNOSIS — E83.42 HYPOMAGNESEMIA: ICD-10-CM

## 2022-11-07 DIAGNOSIS — Z90.49 ACQUIRED ABSENCE OF OTHER SPECIFIED PARTS OF DIGESTIVE TRACT: Chronic | ICD-10-CM

## 2022-11-07 LAB
ALBUMIN SERPL ELPH-MCNC: 4.2 G/DL — SIGNIFICANT CHANGE UP (ref 3.5–5.2)
ALP SERPL-CCNC: 72 U/L — SIGNIFICANT CHANGE UP (ref 30–115)
ALT FLD-CCNC: 30 U/L — SIGNIFICANT CHANGE UP (ref 0–41)
ANION GAP SERPL CALC-SCNC: 10 MMOL/L — SIGNIFICANT CHANGE UP (ref 7–14)
AST SERPL-CCNC: 28 U/L — SIGNIFICANT CHANGE UP (ref 0–41)
BASE EXCESS BLDV CALC-SCNC: 2.1 MMOL/L — SIGNIFICANT CHANGE UP (ref -2–3)
BASOPHILS # BLD AUTO: 0.03 K/UL — SIGNIFICANT CHANGE UP (ref 0–0.2)
BASOPHILS NFR BLD AUTO: 0.4 % — SIGNIFICANT CHANGE UP (ref 0–1)
BILIRUB SERPL-MCNC: 0.4 MG/DL — SIGNIFICANT CHANGE UP (ref 0.2–1.2)
BUN SERPL-MCNC: 20 MG/DL — SIGNIFICANT CHANGE UP (ref 10–20)
CA-I SERPL-SCNC: 1.44 MMOL/L — HIGH (ref 1.15–1.33)
CALCIUM SERPL-MCNC: 11 MG/DL — HIGH (ref 8.4–10.4)
CHLORIDE SERPL-SCNC: 105 MMOL/L — SIGNIFICANT CHANGE UP (ref 98–110)
CO2 SERPL-SCNC: 26 MMOL/L — SIGNIFICANT CHANGE UP (ref 17–32)
CREAT SERPL-MCNC: 1 MG/DL — SIGNIFICANT CHANGE UP (ref 0.7–1.5)
EGFR: 75 ML/MIN/1.73M2 — SIGNIFICANT CHANGE UP
EOSINOPHIL # BLD AUTO: 0.13 K/UL — SIGNIFICANT CHANGE UP (ref 0–0.7)
EOSINOPHIL NFR BLD AUTO: 1.8 % — SIGNIFICANT CHANGE UP (ref 0–8)
GAS PNL BLDV: 138 MMOL/L — SIGNIFICANT CHANGE UP (ref 136–145)
GAS PNL BLDV: SIGNIFICANT CHANGE UP
GAS PNL BLDV: SIGNIFICANT CHANGE UP
GLUCOSE SERPL-MCNC: 114 MG/DL — HIGH (ref 70–99)
HCO3 BLDV-SCNC: 28 MMOL/L — SIGNIFICANT CHANGE UP (ref 22–29)
HCT VFR BLD CALC: 42.2 % — SIGNIFICANT CHANGE UP (ref 42–52)
HCT VFR BLDA CALC: 46 % — SIGNIFICANT CHANGE UP (ref 39–51)
HGB BLD CALC-MCNC: 15.3 G/DL — SIGNIFICANT CHANGE UP (ref 12.6–17.4)
HGB BLD-MCNC: 14.5 G/DL — SIGNIFICANT CHANGE UP (ref 14–18)
IMM GRANULOCYTES NFR BLD AUTO: 0.1 % — SIGNIFICANT CHANGE UP (ref 0.1–0.3)
LACTATE BLDV-MCNC: 1 MMOL/L — SIGNIFICANT CHANGE UP (ref 0.5–2)
LYMPHOCYTES # BLD AUTO: 1.47 K/UL — SIGNIFICANT CHANGE UP (ref 1.2–3.4)
LYMPHOCYTES # BLD AUTO: 20.4 % — LOW (ref 20.5–51.1)
MAGNESIUM SERPL-MCNC: 1.4 MG/DL — LOW (ref 1.8–2.4)
MCHC RBC-ENTMCNC: 32.6 PG — HIGH (ref 27–31)
MCHC RBC-ENTMCNC: 34.4 G/DL — SIGNIFICANT CHANGE UP (ref 32–37)
MCV RBC AUTO: 94.8 FL — HIGH (ref 80–94)
MONOCYTES # BLD AUTO: 0.76 K/UL — HIGH (ref 0.1–0.6)
MONOCYTES NFR BLD AUTO: 10.5 % — HIGH (ref 1.7–9.3)
NEUTROPHILS # BLD AUTO: 4.82 K/UL — SIGNIFICANT CHANGE UP (ref 1.4–6.5)
NEUTROPHILS NFR BLD AUTO: 66.8 % — SIGNIFICANT CHANGE UP (ref 42.2–75.2)
NRBC # BLD: 0 /100 WBCS — SIGNIFICANT CHANGE UP (ref 0–0)
NT-PROBNP SERPL-SCNC: 467 PG/ML — HIGH (ref 0–300)
PCO2 BLDV: 46 MMHG — SIGNIFICANT CHANGE UP (ref 42–55)
PH BLDV: 7.39 — SIGNIFICANT CHANGE UP (ref 7.32–7.43)
PLATELET # BLD AUTO: 200 K/UL — SIGNIFICANT CHANGE UP (ref 130–400)
PO2 BLDV: 43 MMHG — SIGNIFICANT CHANGE UP
POTASSIUM BLDV-SCNC: 4.2 MMOL/L — SIGNIFICANT CHANGE UP (ref 3.5–5.1)
POTASSIUM SERPL-MCNC: 4.4 MMOL/L — SIGNIFICANT CHANGE UP (ref 3.5–5)
POTASSIUM SERPL-SCNC: 4.4 MMOL/L — SIGNIFICANT CHANGE UP (ref 3.5–5)
PROT SERPL-MCNC: 6.7 G/DL — SIGNIFICANT CHANGE UP (ref 6–8)
RBC # BLD: 4.45 M/UL — LOW (ref 4.7–6.1)
RBC # FLD: 12.6 % — SIGNIFICANT CHANGE UP (ref 11.5–14.5)
SAO2 % BLDV: 70.4 % — SIGNIFICANT CHANGE UP
SARS-COV-2 RNA SPEC QL NAA+PROBE: SIGNIFICANT CHANGE UP
SODIUM SERPL-SCNC: 141 MMOL/L — SIGNIFICANT CHANGE UP (ref 135–146)
TROPONIN T SERPL-MCNC: <0.01 NG/ML — SIGNIFICANT CHANGE UP
WBC # BLD: 7.22 K/UL — SIGNIFICANT CHANGE UP (ref 4.8–10.8)
WBC # FLD AUTO: 7.22 K/UL — SIGNIFICANT CHANGE UP (ref 4.8–10.8)

## 2022-11-07 PROCEDURE — 93000 ELECTROCARDIOGRAM COMPLETE: CPT

## 2022-11-07 PROCEDURE — 71045 X-RAY EXAM CHEST 1 VIEW: CPT | Mod: 26

## 2022-11-07 PROCEDURE — 71045 X-RAY EXAM CHEST 1 VIEW: CPT | Mod: 26,77

## 2022-11-07 PROCEDURE — 93010 ELECTROCARDIOGRAM REPORT: CPT

## 2022-11-07 PROCEDURE — 99214 OFFICE O/P EST MOD 30 MIN: CPT | Mod: 25

## 2022-11-07 PROCEDURE — 99291 CRITICAL CARE FIRST HOUR: CPT

## 2022-11-07 RX ORDER — TIMOLOL 0.5 %
1 DROPS OPHTHALMIC (EYE) DAILY
Refills: 0 | Status: DISCONTINUED | OUTPATIENT
Start: 2022-11-07 | End: 2022-11-09

## 2022-11-07 RX ORDER — SIMVASTATIN 20 MG/1
20 TABLET, FILM COATED ORAL AT BEDTIME
Refills: 0 | Status: DISCONTINUED | OUTPATIENT
Start: 2022-11-07 | End: 2022-11-09

## 2022-11-07 RX ORDER — VALSARTAN 80 MG/1
12.5 TABLET ORAL
Refills: 0 | Status: DISCONTINUED | OUTPATIENT
Start: 2022-11-07 | End: 2022-11-07

## 2022-11-07 RX ORDER — VALSARTAN 80 MG/1
160 TABLET ORAL DAILY
Refills: 0 | Status: DISCONTINUED | OUTPATIENT
Start: 2022-11-07 | End: 2022-11-09

## 2022-11-07 RX ORDER — ONDANSETRON 8 MG/1
4 TABLET, FILM COATED ORAL ONCE
Refills: 0 | Status: COMPLETED | OUTPATIENT
Start: 2022-11-07 | End: 2022-11-07

## 2022-11-07 RX ORDER — ENOXAPARIN SODIUM 100 MG/ML
40 INJECTION SUBCUTANEOUS EVERY 24 HOURS
Refills: 0 | Status: DISCONTINUED | OUTPATIENT
Start: 2022-11-07 | End: 2022-11-08

## 2022-11-07 RX ORDER — MAGNESIUM SULFATE 500 MG/ML
2 VIAL (ML) INJECTION ONCE
Refills: 0 | Status: COMPLETED | OUTPATIENT
Start: 2022-11-07 | End: 2022-11-07

## 2022-11-07 RX ORDER — OMEGA-3 ACID ETHYL ESTERS 1 G
1 CAPSULE ORAL DAILY
Refills: 0 | Status: DISCONTINUED | OUTPATIENT
Start: 2022-11-07 | End: 2022-11-09

## 2022-11-07 RX ORDER — GLUCAGON INJECTION, SOLUTION 0.5 MG/.1ML
5 INJECTION, SOLUTION SUBCUTANEOUS ONCE
Refills: 0 | Status: COMPLETED | OUTPATIENT
Start: 2022-11-07 | End: 2022-11-07

## 2022-11-07 RX ORDER — LATANOPROST 0.05 MG/ML
1 SOLUTION/ DROPS OPHTHALMIC; TOPICAL AT BEDTIME
Refills: 0 | Status: DISCONTINUED | OUTPATIENT
Start: 2022-11-07 | End: 2022-11-09

## 2022-11-07 RX ORDER — AMLODIPINE BESYLATE 2.5 MG/1
5 TABLET ORAL DAILY
Refills: 0 | Status: DISCONTINUED | OUTPATIENT
Start: 2022-11-07 | End: 2022-11-09

## 2022-11-07 RX ORDER — FAMOTIDINE 10 MG/ML
30 INJECTION INTRAVENOUS DAILY
Refills: 0 | Status: DISCONTINUED | OUTPATIENT
Start: 2022-11-07 | End: 2022-11-09

## 2022-11-07 RX ADMIN — GLUCAGON INJECTION, SOLUTION 5 MILLIGRAM(S): 0.5 INJECTION, SOLUTION SUBCUTANEOUS at 15:27

## 2022-11-07 RX ADMIN — Medication 25 GRAM(S): at 16:19

## 2022-11-07 RX ADMIN — SIMVASTATIN 20 MILLIGRAM(S): 20 TABLET, FILM COATED ORAL at 22:43

## 2022-11-07 RX ADMIN — ONDANSETRON 4 MILLIGRAM(S): 8 TABLET, FILM COATED ORAL at 15:25

## 2022-11-07 NOTE — REASON FOR VISIT
[FreeTextEntry1] : Patient presents for follow up and a review of his lab results. He was noted to be bradycardic with a HR of 35 bpm.
No

## 2022-11-07 NOTE — ED PROCEDURE NOTE - CPROC ED TRANSVE PACE DETAIL1
The vein was accessed using an introducer. A pacing catheter was advanced.  The positive and negative electrodes were connected and demand pacing was initiated.  The rate and milliamp output were set./Ultrasound guidance was used.

## 2022-11-07 NOTE — ASSESSMENT
[FreeTextEntry1] : Bradycardia with a HR of 35 bpm\par Second degree AV block\par LBBB\par Hypertensive heart disease\par Hyperlipidemia\par Obesity has improved with a 50 lb. weight loss\par DM

## 2022-11-07 NOTE — H&P ADULT - ASSESSMENT
#Complete heart block s/p transcutaneous pacing   #HTN  #DLD  #Jez's Esophagus       CARDIOVASCULAR  # Hemodynamically stable on admission, s/p TCP, EP consulted for permanent pacer, NPO after midnight, preop labs, holding metoprolol, c/w home valsartan+amlodipine for HTN   PULMONARY  # Saturating well on room air, CXR no acute CP disease   GASTROINTESTINAL  # C/w home Pepcid, NPO after midnight    RENAL  # Avoid nephrotoxic medications   Neurology  # Avoid sedation   INFECTIOUS DISEASE  # No indication for abx   ENDOCRINE  # Fu AM TSH, Lipid Profile, A1C  HEME  # Daily CBC   FLUIDS/ELECTROLYTES/NUTRITION  -IVF Not Indicated   -Monitor, Replete to K>4 and Mg>2  -Diet, NPO after midnight   PROPHYLAXIS  -DVT, lovenox 40 QD  -GI, Pepcid 30 QD    DISPO, Acute   CODE STATUS, Full  #Complete heart block s/p transcutaneous pacing   #HTN  #DLD  #Jez's Esophagus       CARDIOVASCULAR  # Hemodynamically stable on admission, s/p TCP, EP consulted for permanent pacer, NPO after midnight, preop labs, holding metoprolol, c/w home valsartan+amlodipine for HTN, Pending Echo, Cardio consult  Dr. Mensah  PULMONARY  # Saturating well on room air, CXR no acute CP disease   GASTROINTESTINAL  # C/w home Pepcid, NPO after midnight    RENAL  # Avoid nephrotoxic medications   Neurology  # Avoid sedation   INFECTIOUS DISEASE  # No indication for abx   ENDOCRINE  # Fu AM TSH, Lipid Profile, A1C  HEME  # Daily CBC   FLUIDS/ELECTROLYTES/NUTRITION  -IVF Not Indicated   -Monitor, Replete to K>4 and Mg>2  -Diet, NPO after midnight   PROPHYLAXIS  -DVT, lovenox 40 QD  -GI, Pepcid 30 QD    DISPO, Acute   CODE STATUS, Full  #Complete heart block s/p transcutaneous pacing   #HTN  #DLD  #Jez's Esophagus       CARDIOVASCULAR  # Hemodynamically stable on admission, s/p TCP, EP consulted for permanent pacer, NPO after midnight, preop labs, holding metoprolol, c/w home valsartan+amlodipine for HTN, Pending Echo, TSH, Lyme, Urine Tox, Cardio consult  Dr. Mensah  PULMONARY  # Saturating well on room air, CXR no acute CP disease   GASTROINTESTINAL  # C/w home Pepcid, NPO after midnight    RENAL  # Avoid nephrotoxic medications   Neurology  # Avoid sedation   INFECTIOUS DISEASE  # No indication for abx   ENDOCRINE  # Fu AM TSH, Lipid Profile, A1C  HEME  # Daily CBC   FLUIDS/ELECTROLYTES/NUTRITION  -IVF Not Indicated   -Monitor, Replete to K>4 and Mg>2  -Diet, NPO after midnight   PROPHYLAXIS  -DVT, lovenox 40 QD  -GI, Pepcid 30 QD    DISPO, Acute   CODE STATUS, Full  IMPRESSION  #Complete heart block s/p TVP   #HTN  #DLD  #Jez's Esophagus     PLAN  CARDIOVASCULAR  # Hemodynamically stable on admission, s/p TCVP, EP consulted for permanent pacer, NPO after midnight, preop labs, holding metoprolol, c/w home valsartan+amlodipine for HTN, Pending Echo, TSH, Lyme, Urine Tox     PULMONARY  # Saturating well on room air, CXR no acute CP disease. Keep SPO2 >92%     GASTROINTESTINAL  # C/w home Pepcid, NPO after midnight      RENAL  # Avoid nephrotoxic medications     Neurology  # Avoid sedation     INFECTIOUS DISEASE  # No indication for abx  # f/u Bell Cx, f/u lyme serology    ENDOCRINE  # Fu AM TSH, Lipid Profile, A1C    HEME  # Daily CBC   # DVT PPx: Hold in am    FLUIDS/ELECTROLYTES/NUTRITION  -Monitor, Replete to K>4 and Mg>2  -Diet, NPO after midnight   PROPHYLAXIS  -DVT, lovenox 40 QD (hold am dose)  -GI, Pepcid 30 QD    DISPO, CCU  CODE STATUS, Full

## 2022-11-07 NOTE — ED ADULT TRIAGE NOTE - CHIEF COMPLAINT QUOTE
Patient sent in by cardiologist for symptomatic bradycardia. Patient HR int triage 37 -42 and patient c/o weakness and dizziness

## 2022-11-07 NOTE — ED ADULT NURSE NOTE - OBJECTIVE STATEMENT
82 yr M, sent by cardiologist for bradycardia. Pt appears well, denies symptoms, A&Ox4, ambulates steady.
Car

## 2022-11-07 NOTE — ED ADULT NURSE NOTE - NSICDXPASTMEDICALHX_GEN_ALL_CORE_FT
PAST MEDICAL HISTORY:  Acute glaucoma     Age-related cataract of both eyes     Barretts esophagus     Basal cell carcinoma (BCC)     History of kidney stones     HTN (hypertension)     Well-controlled hypertension

## 2022-11-07 NOTE — H&P ADULT - NSHPPHYSICALEXAM_GEN_ALL_CORE
GENERAL: Well nourished, comfortable  SKIN: Warm, dry  HEAD & NECK: NCAT, supple neck  EYES: EOMI, PER B/L  ENT: MMM  CARD: Regular; S1, S2; no murmurs, no rubs, no gallops  RESP: Normal respiratory effort, CTAB, no rales, no wheezing  ABD: Soft, ND, NT, no rebound, no guarding  EXT: Pulses palpable distally; no edema  NEUROMSK: Grossly intact  PSYCH: AAOx3, cooperative, appropriate

## 2022-11-07 NOTE — ED ADULT NURSE NOTE - NSICDXPASTSURGICALHX_GEN_ALL_CORE_FT
PAST SURGICAL HISTORY:  H/O lithotripsy     History of appendectomy     History of surgery right iol & parathyroidectomy

## 2022-11-07 NOTE — ED PROVIDER NOTE - OBJECTIVE STATEMENT
Patient is an 81 yo M, h/o    sent in by cardiologist for complete heart block. Patient had a routine appointment with Dr. Mensah today, found to be in complete heart block, and sent to the ED. Patient reports having had generalized weakness and FLANNERY for several weeks, but did not think much of it. No CP, diaphoresis, nausea/vomiting. No palpitations. Patient is an 83 yo M, h/o high blood pressure, hyperlipidemia, on metoprolol who was sent in by cardiologist for complete heart block. Patient had a routine appointment with Dr. Mensah today, found to be in complete heart block, and sent to the ED. Patient reports having had generalized weakness and FLANNERY for several weeks, but did not think much of it. Patient reports feeling dizzy for the past few days. No CP, diaphoresis, nausea/vomiting. No palpitations.

## 2022-11-07 NOTE — H&P ADULT - HISTORY OF PRESENT ILLNESS
HPI:  Patient is an 81 yo M, h/o high blood pressure, hyperlipidemia, on metoprolol who was sent in by cardiologist for complete heart block. Patient had a routine appointment with Dr. Mensah today, found to be in complete heart block, and sent to the ED. Patient reports having had generalized weakness and FLANNERY for several weeks, but did not think much of it. Patient reports feeling dizzy for the past few days. No CP, diaphoresis, nausea/vomiting. No palpitations.    In the ER:   Vitals: HR 32, RR 18, T 98.2, SpO2 99% RA  Labs: Mg 1.4,   EKG: Complete heart block   Imaging: CXR --> No acute CP disease, Pacer in place     Interventions in the ER: Glucagon 5, Mg 2, Zofran 5, Immediate Transcutaneous Pacing

## 2022-11-07 NOTE — ED PROVIDER NOTE - PHYSICAL EXAMINATION
_  Vital signs reviewed; ABCs intact  GENERAL: Well nourished, comfortable  SKIN: Warm, dry  HEAD & NECK: NCAT, supple neck  EYES: EOMI, PER B/L  ENT: MMM  CARD: +Bradycardic, but regular; S1, S2; no murmurs, no rubs, no gallops  RESP: Normal respiratory effort, CTAB, no rales, no wheezing  ABD: Soft, ND, NT, no rebound, no guarding  EXT: Pulses palpable distally; no edema  NEUROMSK: Grossly intact  PSYCH: AAOx3, cooperative, appropriate

## 2022-11-07 NOTE — ED PROVIDER NOTE - CARE PLAN
Assessment and plan of treatment:	Plan: Monitor, Pacer Pads, EKG, CXR, labs, reassess.   1 Principal Discharge DX:	Complete heart block  Assessment and plan of treatment:	Plan: Monitor, Pacer Pads, EKG, CXR, labs, reassess.

## 2022-11-07 NOTE — DISCUSSION/SUMMARY
[Coronary Artery Disease] : coronary artery disease [Stable] : stable [Dietary Modification] : dietary modification [FreeTextEntry1] : Patient was advised to go to the ER directly.\par He was able to go there independently on his own accord.\par The patient will discontinue his metoprolol and timolol.\par I called the EP physician on call, Dr. Torres and left a message on his voicemail since he did not answer directly.\par The patient's spouse was also notified that the patient is being sent to [facility] ER for admission and possible need for a PPM.\par His lab test results were reviewed with him as well.\par RV in 2 weeks.

## 2022-11-07 NOTE — PATIENT PROFILE ADULT - NSPRESCRALCFREQ_GEN_A_NUR
[Headache] : headache [Blurry Vision] : blurred vision [Shortness Of Breath] : shortness of breath [Abdominal Pain] : abdominal pain [Urinary Frequency] : urinary frequency [Joint Pain] : joint pain [Itching] : itching [Anxiety] : anxiety [Under Stress] : under stress [Negative] : Endocrine [Fever] : no fever [Chills] : no chills [Feeling Fatigued] : not feeling fatigued [Seeing Double (Diplopia)] : no diplopia [Eye Pain] : no eye pain [Eyeglasses] : not currently wearing eyeglasses [Chest Pain] : no chest pain [Lower Ext Edema] : no extremity edema [Palpitations] : no palpitations [Nausea] : no nausea [Vomiting] : no vomiting [Dysphagia] : no dysphagia [Heartburn] : no heartburn [Limb Weakness (Paresis)] : no limb weakness [Muscle Cramps] : no muscle cramps [Skin: A Rash] : no rash: [Skin Lesions] : no skin lesions [Confusion] : no confusion was observed [Depression] : no depression [Suicidal] : not suicidal [Easy Bleeding] : no tendency for easy bleeding [Easy Bruising] : no tendency for easy bruising Monthly or less

## 2022-11-07 NOTE — ED PROVIDER NOTE - CLINICAL SUMMARY MEDICAL DECISION MAKING FREE TEXT BOX
I have fully discussed the medical management and delivery of care with the patient. I have discussed any available labs, imaging and treatment options with the patient.  Pt admitted for further care & management.  CCU team report admission to CCU.

## 2022-11-07 NOTE — ED PROVIDER NOTE - PROGRESS NOTE DETAILS
ED Attending PAPO Almaraz  Patient presented in complete heart block, transvenous pacer is being placed, electrophysiology Dr. Aquino and CCU/cardiology fellow at bedside. Authored by Dr. Abrams: Patient endorsed to Dr. Aquino and Dr. Mendoza (Cardiology fellow). Patient to be admitted to CCU under Dr. Sarmiento, plan for pacemaker tomorrow.

## 2022-11-08 LAB
ALBUMIN SERPL ELPH-MCNC: 3.8 G/DL — SIGNIFICANT CHANGE UP (ref 3.5–5.2)
ALP SERPL-CCNC: 64 U/L — SIGNIFICANT CHANGE UP (ref 30–115)
ALT FLD-CCNC: 24 U/L — SIGNIFICANT CHANGE UP (ref 0–41)
ANION GAP SERPL CALC-SCNC: 11 MMOL/L — SIGNIFICANT CHANGE UP (ref 7–14)
APTT BLD: 31.7 SEC — SIGNIFICANT CHANGE UP (ref 27–39.2)
AST SERPL-CCNC: 27 U/L — SIGNIFICANT CHANGE UP (ref 0–41)
B BURGDOR C6 AB SER-ACNC: NEGATIVE — SIGNIFICANT CHANGE UP
B BURGDOR IGG+IGM SER-ACNC: 0.04 INDEX — SIGNIFICANT CHANGE UP (ref 0.01–0.89)
BILIRUB SERPL-MCNC: 0.4 MG/DL — SIGNIFICANT CHANGE UP (ref 0.2–1.2)
BUN SERPL-MCNC: 18 MG/DL — SIGNIFICANT CHANGE UP (ref 10–20)
CALCIUM SERPL-MCNC: 9.8 MG/DL — SIGNIFICANT CHANGE UP (ref 8.4–10.5)
CHLORIDE SERPL-SCNC: 106 MMOL/L — SIGNIFICANT CHANGE UP (ref 98–110)
CHOLEST SERPL-MCNC: 158 MG/DL — SIGNIFICANT CHANGE UP
CO2 SERPL-SCNC: 26 MMOL/L — SIGNIFICANT CHANGE UP (ref 17–32)
CREAT SERPL-MCNC: 0.9 MG/DL — SIGNIFICANT CHANGE UP (ref 0.7–1.5)
EGFR: 85 ML/MIN/1.73M2 — SIGNIFICANT CHANGE UP
ESTIMATED AVERAGE GLUCOSE: 120 MG/DL — HIGH (ref 68–114)
GLUCOSE SERPL-MCNC: 82 MG/DL — SIGNIFICANT CHANGE UP (ref 70–99)
HCT VFR BLD CALC: 43.1 % — SIGNIFICANT CHANGE UP (ref 42–52)
HDLC SERPL-MCNC: 48 MG/DL — SIGNIFICANT CHANGE UP
HGB BLD-MCNC: 14.9 G/DL — SIGNIFICANT CHANGE UP (ref 14–18)
INR BLD: 1.07 RATIO — SIGNIFICANT CHANGE UP (ref 0.65–1.3)
LIPID PNL WITH DIRECT LDL SERPL: 84 MG/DL — SIGNIFICANT CHANGE UP
MAGNESIUM SERPL-MCNC: 1.7 MG/DL — LOW (ref 1.8–2.4)
MCHC RBC-ENTMCNC: 32.5 PG — HIGH (ref 27–31)
MCHC RBC-ENTMCNC: 34.6 G/DL — SIGNIFICANT CHANGE UP (ref 32–37)
MCV RBC AUTO: 94.1 FL — HIGH (ref 80–94)
NON HDL CHOLESTEROL: 110 MG/DL — SIGNIFICANT CHANGE UP
NRBC # BLD: 0 /100 WBCS — SIGNIFICANT CHANGE UP (ref 0–0)
PLATELET # BLD AUTO: 192 K/UL — SIGNIFICANT CHANGE UP (ref 130–400)
POTASSIUM SERPL-MCNC: 3.9 MMOL/L — SIGNIFICANT CHANGE UP (ref 3.5–5)
POTASSIUM SERPL-SCNC: 3.9 MMOL/L — SIGNIFICANT CHANGE UP (ref 3.5–5)
PROT SERPL-MCNC: 5.7 G/DL — LOW (ref 6–8)
PROTHROM AB SERPL-ACNC: 12.2 SEC — SIGNIFICANT CHANGE UP (ref 9.95–12.87)
RBC # BLD: 4.58 M/UL — LOW (ref 4.7–6.1)
RBC # FLD: 12.8 % — SIGNIFICANT CHANGE UP (ref 11.5–14.5)
SODIUM SERPL-SCNC: 143 MMOL/L — SIGNIFICANT CHANGE UP (ref 135–146)
TRIGL SERPL-MCNC: 133 MG/DL — SIGNIFICANT CHANGE UP
TSH SERPL-MCNC: 3.1 UIU/ML — SIGNIFICANT CHANGE UP (ref 0.27–4.2)
WBC # BLD: 9.01 K/UL — SIGNIFICANT CHANGE UP (ref 4.8–10.8)
WBC # FLD AUTO: 9.01 K/UL — SIGNIFICANT CHANGE UP (ref 4.8–10.8)

## 2022-11-08 PROCEDURE — 93010 ELECTROCARDIOGRAM REPORT: CPT

## 2022-11-08 PROCEDURE — 99223 1ST HOSP IP/OBS HIGH 75: CPT | Mod: 57

## 2022-11-08 PROCEDURE — 71045 X-RAY EXAM CHEST 1 VIEW: CPT | Mod: 26

## 2022-11-08 PROCEDURE — 33208 INSRT HEART PM ATRIAL & VENT: CPT | Mod: KX

## 2022-11-08 PROCEDURE — 93306 TTE W/DOPPLER COMPLETE: CPT | Mod: 26

## 2022-11-08 PROCEDURE — 71045 X-RAY EXAM CHEST 1 VIEW: CPT | Mod: 26,77

## 2022-11-08 RX ORDER — CEFAZOLIN SODIUM 1 G
1000 VIAL (EA) INJECTION EVERY 8 HOURS
Refills: 0 | Status: DISCONTINUED | OUTPATIENT
Start: 2022-11-08 | End: 2022-11-09

## 2022-11-08 RX ORDER — CHLORHEXIDINE GLUCONATE 213 G/1000ML
1 SOLUTION TOPICAL
Refills: 0 | Status: DISCONTINUED | OUTPATIENT
Start: 2022-11-08 | End: 2022-11-09

## 2022-11-08 RX ORDER — ACETAMINOPHEN 500 MG
650 TABLET ORAL ONCE
Refills: 0 | Status: COMPLETED | OUTPATIENT
Start: 2022-11-08 | End: 2022-11-08

## 2022-11-08 RX ORDER — HYDRALAZINE HCL 50 MG
10 TABLET ORAL ONCE
Refills: 0 | Status: COMPLETED | OUTPATIENT
Start: 2022-11-08 | End: 2022-11-08

## 2022-11-08 RX ORDER — MAGNESIUM SULFATE 500 MG/ML
2 VIAL (ML) INJECTION ONCE
Refills: 0 | Status: COMPLETED | OUTPATIENT
Start: 2022-11-08 | End: 2022-11-08

## 2022-11-08 RX ORDER — HYDRALAZINE HCL 50 MG
5 TABLET ORAL ONCE
Refills: 0 | Status: COMPLETED | OUTPATIENT
Start: 2022-11-08 | End: 2022-11-08

## 2022-11-08 RX ORDER — CEFAZOLIN SODIUM 1 G
2000 VIAL (EA) INJECTION ONCE
Refills: 0 | Status: COMPLETED | OUTPATIENT
Start: 2022-11-08 | End: 2022-11-08

## 2022-11-08 RX ORDER — CEFAZOLIN SODIUM 1 G
1000 VIAL (EA) INJECTION ONCE
Refills: 0 | Status: COMPLETED | OUTPATIENT
Start: 2022-11-08 | End: 2022-11-08

## 2022-11-08 RX ADMIN — FAMOTIDINE 30 MILLIGRAM(S): 10 INJECTION INTRAVENOUS at 11:27

## 2022-11-08 RX ADMIN — LATANOPROST 1 DROP(S): 0.05 SOLUTION/ DROPS OPHTHALMIC; TOPICAL at 21:12

## 2022-11-08 RX ADMIN — AMLODIPINE BESYLATE 5 MILLIGRAM(S): 2.5 TABLET ORAL at 05:19

## 2022-11-08 RX ADMIN — CHLORHEXIDINE GLUCONATE 1 APPLICATION(S): 213 SOLUTION TOPICAL at 05:20

## 2022-11-08 RX ADMIN — Medication 1 DROP(S): at 12:17

## 2022-11-08 RX ADMIN — Medication 650 MILLIGRAM(S): at 18:08

## 2022-11-08 RX ADMIN — Medication 100 MILLIGRAM(S): at 23:15

## 2022-11-08 RX ADMIN — SIMVASTATIN 20 MILLIGRAM(S): 20 TABLET, FILM COATED ORAL at 21:12

## 2022-11-08 RX ADMIN — LATANOPROST 1 DROP(S): 0.05 SOLUTION/ DROPS OPHTHALMIC; TOPICAL at 02:16

## 2022-11-08 RX ADMIN — Medication 25 GRAM(S): at 10:42

## 2022-11-08 RX ADMIN — VALSARTAN 160 MILLIGRAM(S): 80 TABLET ORAL at 05:19

## 2022-11-08 RX ADMIN — Medication 100 MILLIGRAM(S): at 15:20

## 2022-11-08 RX ADMIN — Medication 650 MILLIGRAM(S): at 17:28

## 2022-11-08 RX ADMIN — Medication 5 MILLIGRAM(S): at 01:15

## 2022-11-08 RX ADMIN — Medication 1 GRAM(S): at 11:31

## 2022-11-08 RX ADMIN — Medication 10 MILLIGRAM(S): at 04:15

## 2022-11-08 RX ADMIN — Medication 100 MILLIGRAM(S): at 16:09

## 2022-11-08 NOTE — CONSULT NOTE ADULT - TIME BILLING
CHB  PPM Implant  I have explained the risks and benefits of the procedure to the patient.  I have explained the risks and benefits of the procedure to the patient.  There is approximately 1% chance of any major cardiovascular complication to occur. Complications include, but are not limited to infection, bleeding, damage to the vessels, pneumothorax, stroke, death and heart attack.  The patient understands the risk and would like to proceed with the procedure. Patient indicated that all of his questions were answered to his satisfaction and verbalized understanding.

## 2022-11-08 NOTE — CONSULT NOTE ADULT - ASSESSMENT
CHB  HTN  DLD    -s/p TVP in ED, currently V-paced at 80bpm with 20 mA  -currently hemodynamically stable  -was on metoprolol, timolol ; now held since being sent by his cardiologist. Continue to hold  -f/u TTE  -Lyme titres  -TFTs  -will likely need PPM ultimately

## 2022-11-08 NOTE — CHART NOTE - NSCHARTNOTEFT_GEN_A_CORE
CCU Transfer Note    Transfer from: CCU  Transfer to:  (  ) Medicine    (  ) Telemetry    (  ) RCU    (  ) Palliative    (  ) Stroke Unit    ( X )Cardiac Tele   Accepting physician:    CCU COURSE:    HPI:  Patient is an 81 yo M, h/o high blood pressure, hyperlipidemia, on metoprolol who was sent in by cardiologist for complete heart block. Patient had a routine appointment with Dr. Mensah today, found to be in complete heart block, and sent to the ED. Patient reports having had generalized weakness and FLANNERY for several weeks, but did not think much of it. Patient reports feeling dizzy for the past few days. No CP, diaphoresis, nausea/vomiting. No palpitations.    In the ER:   Vitals: HR 32, RR 18, T 98.2, SpO2 99% RA  Labs: Mg 1.4,   EKG: Complete heart block   Imaging: CXR --> No acute CP disease, Pacer in place     Interventions in the ER: Glucagon 5, Mg 2, Zofran 5, Immediate Transcutaneous Pacing     While in the CCU patient was paced at 80bpm and stable. Patient had pacemaker placement today. Pacemaker placed successfully with no complications. EP onboard. Patient stable for downgrade to cardiac tele     Follow up    - TTE   - Lyme titers   - Morning CXR and labs   - Ancef 1g q8  - Device interrogation in the morning.   - Keep K >4 and Mag >2   - Holding anticoagulation for now per EP recs     ASSESSMENT & PLAN:   IMPRESSION  #Complete heart block s/p TVP   #HTN  #DLD  #Jez's Esophagus     PLAN  CARDIOVASCULAR  # Hemodynamically stable on admission, s/p TCVP, EP consulted for permanent pacer, PPM placed today. CXR ordered. holding metoprolol, c/w home valsartan+amlodipine for HTN, Pending Echo, TSH, Lyme, Urine Tox     PULMONARY  # Saturating well on room air, CXR no acute CP disease. Keep SPO2 >92%     GASTROINTESTINAL  # C/w home Pepcid, DASH diet     RENAL  # Avoid nephrotoxic medications     Neurology  # Avoid sedation     INFECTIOUS DISEASE  # Ancef s/p PPM  # f/u Bell Cx, f/u lyme serology    ENDOCRINE  # Fu AM TSH, Lipid Profile, A1C - appreciated     HEME  # Daily CBC   # DVT PPx: Holding for now per EP recs     FLUIDS/ELECTROLYTES/NUTRITION  -Monitor, Replete to K>4 and Mg>2  -Diet, DASH   PROPHYLAXIS  -DVT, Holding for now   -GI, Pepcid 30 QD    DISPO, CCU  CODE STATUS, Full       MEDICATIONS:  STANDING MEDICATIONS  acetaminophen     Tablet .. 650 milliGRAM(s) Oral once  amLODIPine   Tablet 5 milliGRAM(s) Oral daily  ceFAZolin   IVPB 1000 milliGRAM(s) IV Intermittent every 8 hours  chlorhexidine 2% Cloths 1 Application(s) Topical <User Schedule>  enoxaparin Injectable 40 milliGRAM(s) SubCutaneous every 24 hours  famotidine    Tablet 30 milliGRAM(s) Oral daily  latanoprost 0.005% Ophthalmic Solution 1 Drop(s) Both EYES at bedtime  omega-3-Acid Ethyl Esters 1 Gram(s) Oral daily  simvastatin 20 milliGRAM(s) Oral at bedtime  timolol 0.5% Solution 1 Drop(s) Both EYES daily  valsartan 160 milliGRAM(s) Oral daily    PRN MEDICATIONS      VITAL SIGNS: Last 24 Hours  T(C): 36.6 (08 Nov 2022 17:00), Max: 36.7 (07 Nov 2022 21:05)  T(F): 97.8 (08 Nov 2022 17:00), Max: 98 (07 Nov 2022 21:05)  HR: 74 (08 Nov 2022 17:15) (58 - 74)  BP: 172/74 (08 Nov 2022 17:15) (141/63 - 215/105)  BP(mean): 107 (08 Nov 2022 17:15) (90 - 150)  RR: 17 (08 Nov 2022 17:15) (12 - 26)  SpO2: 96% (08 Nov 2022 17:15) (95% - 100%)    LABS:                        14.9   9.01  )-----------( 192      ( 08 Nov 2022 05:15 )             43.1     11-08    143  |  106  |  18  ----------------------------<  82  3.9   |  26  |  0.9    Ca    9.8      08 Nov 2022 05:15  Mg     1.7     11-08    TPro  5.7<L>  /  Alb  3.8  /  TBili  0.4  /  DBili  x   /  AST  27  /  ALT  24  /  AlkPhos  64  11-08    PT/INR - ( 08 Nov 2022 05:15 )   PT: 12.20 sec;   INR: 1.07 ratio         PTT - ( 08 Nov 2022 05:15 )  PTT:31.7 sec        CARDIAC MARKERS ( 07 Nov 2022 15:22 )  x     / <0.01 ng/mL / x     / x     / x          RADIOLOGY:

## 2022-11-08 NOTE — CONSULT NOTE ADULT - SUBJECTIVE AND OBJECTIVE BOX
Cardiologist: Makenna    HPI:  HPI:  Patient is an 81 yo M, h/o high blood pressure, hyperlipidemia, on metoprolol who was sent in by cardiologist for complete heart block. Patient had a routine appointment with Dr. Mensah today, found to be in complete heart block, and sent to the ED. Patient reports having had generalized weakness and FLANNERY for several weeks, but did not think much of it. Patient reports feeling dizzy for the past few days. No CP, diaphoresis, nausea/vomiting. No palpitations.    In the ER:   Vitals: HR 32, RR 18, T 98.2, SpO2 99% RA  Labs: Mg 1.4,   EKG: Complete heart block   Imaging: CXR --> No acute CP disease, Pacer in place     Interventions in the ER: Glucagon 5, Mg 2, Zofran 5, Immediate Transcutaneous Pacing  (2022 20:42)    PAST MEDICAL & SURGICAL HISTORY  Well-controlled hypertension    Barretts esophagus    History of kidney stones    Basal cell carcinoma (BCC)    Acute glaucoma    HTN (hypertension)    Age-related cataract of both eyes    History of surgery  right iol &amp; parathyroidectomy    H/O lithotripsy    History of appendectomy    FAMILY HISTORY:  FAMILY HISTORY:    [ ] no pertinent family history of premature cardiovascular disease in first degree relatives.  Mother:   Father:   Siblings:     SOCIAL HISTORY:  []smoker  []Alcohol  []Drug    ALLERGIES:  No Known Allergies    MEDICATIONS:  MEDICATIONS  (STANDING):  amLODIPine   Tablet 5 milliGRAM(s) Oral daily  chlorhexidine 2% Cloths 1 Application(s) Topical <User Schedule>  enoxaparin Injectable 40 milliGRAM(s) SubCutaneous every 24 hours  famotidine    Tablet 30 milliGRAM(s) Oral daily  latanoprost 0.005% Ophthalmic Solution 1 Drop(s) Both EYES at bedtime  omega-3-Acid Ethyl Esters 1 Gram(s) Oral daily  simvastatin 20 milliGRAM(s) Oral at bedtime  timolol 0.5% Solution 1 Drop(s) Both EYES daily  valsartan 160 milliGRAM(s) Oral daily    HOME MEDICATIONS:  Home Medications:  amLODIPine 2.5 mg oral tablet: 1 tab(s) orally once a day (10 Jul 2019 08:40)  Folbic oral tablet: 1 tab(s) orally once a day (10 Jul 2019 08:40)  ketorolac 0.5% ophthalmic solution: 1 drop(s) to each affected eye 4 times a day (right eye) (10 Jul 2019 08:40)  latanoprost 0.005% ophthalmic solution: 1 drop(s) to each affected eye once a day (10 Jul 2019 08:40)  Lovaza 1000 mg oral capsule: 1 cap(s) orally (10 Jul 2019 08:40)  metoprolol succinate 25 mg oral tablet, extended release: 0.5 tab(s) orally 2 times a day (10 Jul 2019 08:40)  prednisoLONE acetate 1% ophthalmic suspension: 1 drop(s) to each affected eye 4 times a day (right eye) (10 Jul 2019 08:40)  Prevacid 30 mg oral delayed release capsule: 1 cap(s) orally once a day (10 Jul 2019 08:40)  simvastatin 20 mg oral tablet: 1 tab(s) orally once a day (at bedtime) (10 Jul 2019 08:40)  timolol ophthalmic long-actin drop(s)  once a day (10 Jul 2019 08:40)  valsartan: 12.5 milligram(s) orally 2 times a day (2022 20:51)  valsartan 160 mg oral capsule: 1 cap(s) orally once a day (10 Jul 2019 08:40)    VITALS:   T(F): 97.2 ( @ 08:00), Max: 98.2 ( @ 15:00)  HR: 69 ( @ 09:00) (32 - 70)  BP: 162/74 ( @ 09:00) (141/63 - 230/91)  BP(mean): 106 ( @ 09:00) (90 - 150)  RR: 16 ( @ 09:00) (12 - 26)  SpO2: 98% ( @ 09:00) (95% - 100%)    I&O's Summary    2022 07:01  -  2022 07:00  --------------------------------------------------------  IN: 0 mL / OUT: 950 mL / NET: -950 mL    REVIEW OF SYSTEMS:    Negative except as mentioned in HPI    PHYSICAL EXAM:  NEURO: Awake , alert and oriented  GEN: Not in acute distress  NECK: No JVD  LUNGS: Clear to auscultation bilaterally   CARDIOVASCULAR: S1/S2 present, RRR , no murmurs or rubs, no carotid bruits,  + PP bilaterally  ABD: Soft, non-tender, non-distended, +BS  EXT: No NABILA  SKIN: Intact    LABS:                        14.9   9.01  )-----------( 192      ( 2022 05:15 )             43.1     11-    143  |  106  |  18  ----------------------------<  82  3.9   |  26  |  0.9    Ca    9.8      2022 05:15  Mg     1.7     -    TPro  5.7<L>  /  Alb  3.8  /  TBili  0.4  /  DBili  x   /  AST  27  /  ALT  24  /  AlkPhos  64  11-08    PT/INR - ( 2022 05:15 )   PT: 12.20 sec;   INR: 1.07 ratio         PTT - ( 2022 05:15 )  PTT:31.7 sec  Troponin T, Serum: <0.01 ng/mL (22 @ 15:22)    CARDIAC MARKERS ( 2022 15:22 )  x     / <0.01 ng/mL / x     / x     / x        Serum Pro-Brain Natriuretic Peptide: 467 pg/mL (22 @ 15:22)    11- Chol 158 LDL -- HDL 48 Trig 133    RADIOLOGY:  -CXR:  < from: Xray Chest 1 View-PORTABLE IMMEDIATE (Xray Chest 1 View-PORTABLE IMMEDIATE .) (22 @ 18:46) >  Impression:    Cardiomegaly. Support devices as described.  Support devices: Telemetry leads are seen. Right neck transvenous pacer.    < end of copied text >    -TTE:    -CCTA:    -STRESS TEST:    -CATHETERIZATION:    ECG:  < from: 12 Lead ECG (22 @ 15:03) >  Ventricular Rate 39 BPM    Atrial Rate 39 BPM    P-R Interval 190 ms    QRS Duration 146 ms    Q-T Interval 502 ms    QTC Calculation(Bazett) 404 ms    P Axis 60 degrees    R Axis 63 degrees    T Axis -15 degrees    Diagnosis Line Normal sinus rhythm with 2nd degree A-V block with 2:1 A-V conduction  Non-specific intra-ventricular conduction block  Abnormal ECG    < end of copied text >      TELEMETRY EVENTS: V-paced @ 80 bpm, 20 mA

## 2022-11-09 ENCOUNTER — TRANSCRIPTION ENCOUNTER (OUTPATIENT)
Age: 82
End: 2022-11-09

## 2022-11-09 VITALS — DIASTOLIC BLOOD PRESSURE: 79 MMHG | SYSTOLIC BLOOD PRESSURE: 163 MMHG | HEART RATE: 100 BPM | RESPIRATION RATE: 18 BRPM

## 2022-11-09 LAB
ALBUMIN SERPL ELPH-MCNC: 3.5 G/DL — SIGNIFICANT CHANGE UP (ref 3.5–5.2)
ALP SERPL-CCNC: 69 U/L — SIGNIFICANT CHANGE UP (ref 30–115)
ALT FLD-CCNC: 18 U/L — SIGNIFICANT CHANGE UP (ref 0–41)
ANION GAP SERPL CALC-SCNC: 10 MMOL/L — SIGNIFICANT CHANGE UP (ref 7–14)
AST SERPL-CCNC: 24 U/L — SIGNIFICANT CHANGE UP (ref 0–41)
BASOPHILS # BLD AUTO: 0.04 K/UL — SIGNIFICANT CHANGE UP (ref 0–0.2)
BASOPHILS NFR BLD AUTO: 0.4 % — SIGNIFICANT CHANGE UP (ref 0–1)
BILIRUB SERPL-MCNC: 0.4 MG/DL — SIGNIFICANT CHANGE UP (ref 0.2–1.2)
BUN SERPL-MCNC: 18 MG/DL — SIGNIFICANT CHANGE UP (ref 10–20)
CALCIUM SERPL-MCNC: 10 MG/DL — SIGNIFICANT CHANGE UP (ref 8.4–10.4)
CHLORIDE SERPL-SCNC: 107 MMOL/L — SIGNIFICANT CHANGE UP (ref 98–110)
CO2 SERPL-SCNC: 25 MMOL/L — SIGNIFICANT CHANGE UP (ref 17–32)
CREAT SERPL-MCNC: 0.9 MG/DL — SIGNIFICANT CHANGE UP (ref 0.7–1.5)
EGFR: 85 ML/MIN/1.73M2 — SIGNIFICANT CHANGE UP
EOSINOPHIL # BLD AUTO: 0.27 K/UL — SIGNIFICANT CHANGE UP (ref 0–0.7)
EOSINOPHIL NFR BLD AUTO: 2.5 % — SIGNIFICANT CHANGE UP (ref 0–8)
GLUCOSE SERPL-MCNC: 104 MG/DL — HIGH (ref 70–99)
HCT VFR BLD CALC: 43.3 % — SIGNIFICANT CHANGE UP (ref 42–52)
HGB BLD-MCNC: 15.2 G/DL — SIGNIFICANT CHANGE UP (ref 14–18)
IMM GRANULOCYTES NFR BLD AUTO: 0.3 % — SIGNIFICANT CHANGE UP (ref 0.1–0.3)
LYMPHOCYTES # BLD AUTO: 1.16 K/UL — LOW (ref 1.2–3.4)
LYMPHOCYTES # BLD AUTO: 10.8 % — LOW (ref 20.5–51.1)
MAGNESIUM SERPL-MCNC: 1.8 MG/DL — SIGNIFICANT CHANGE UP (ref 1.8–2.4)
MCHC RBC-ENTMCNC: 32.7 PG — HIGH (ref 27–31)
MCHC RBC-ENTMCNC: 35.1 G/DL — SIGNIFICANT CHANGE UP (ref 32–37)
MCV RBC AUTO: 93.1 FL — SIGNIFICANT CHANGE UP (ref 80–94)
MONOCYTES # BLD AUTO: 1.02 K/UL — HIGH (ref 0.1–0.6)
MONOCYTES NFR BLD AUTO: 9.5 % — HIGH (ref 1.7–9.3)
NEUTROPHILS # BLD AUTO: 8.18 K/UL — HIGH (ref 1.4–6.5)
NEUTROPHILS NFR BLD AUTO: 76.5 % — HIGH (ref 42.2–75.2)
NRBC # BLD: 0 /100 WBCS — SIGNIFICANT CHANGE UP (ref 0–0)
PLATELET # BLD AUTO: 200 K/UL — SIGNIFICANT CHANGE UP (ref 130–400)
POTASSIUM SERPL-MCNC: 3.9 MMOL/L — SIGNIFICANT CHANGE UP (ref 3.5–5)
POTASSIUM SERPL-SCNC: 3.9 MMOL/L — SIGNIFICANT CHANGE UP (ref 3.5–5)
PROT SERPL-MCNC: 5.8 G/DL — LOW (ref 6–8)
RBC # BLD: 4.65 M/UL — LOW (ref 4.7–6.1)
RBC # FLD: 12.9 % — SIGNIFICANT CHANGE UP (ref 11.5–14.5)
SODIUM SERPL-SCNC: 142 MMOL/L — SIGNIFICANT CHANGE UP (ref 135–146)
WBC # BLD: 10.7 K/UL — SIGNIFICANT CHANGE UP (ref 4.8–10.8)
WBC # FLD AUTO: 10.7 K/UL — SIGNIFICANT CHANGE UP (ref 4.8–10.8)

## 2022-11-09 PROCEDURE — 71046 X-RAY EXAM CHEST 2 VIEWS: CPT | Mod: 26

## 2022-11-09 PROCEDURE — 93280 PM DEVICE PROGR EVAL DUAL: CPT | Mod: 26

## 2022-11-09 PROCEDURE — 99238 HOSP IP/OBS DSCHRG MGMT 30/<: CPT

## 2022-11-09 RX ORDER — AMLODIPINE BESYLATE 2.5 MG/1
1 TABLET ORAL
Qty: 30 | Refills: 0
Start: 2022-11-09 | End: 2022-12-08

## 2022-11-09 RX ORDER — CEFAZOLIN SODIUM 1 G
1000 VIAL (EA) INJECTION ONCE
Refills: 0 | Status: DISCONTINUED | OUTPATIENT
Start: 2022-11-09 | End: 2022-11-09

## 2022-11-09 RX ORDER — CEFAZOLIN SODIUM 1 G
1000 VIAL (EA) INJECTION ONCE
Refills: 0 | Status: COMPLETED | OUTPATIENT
Start: 2022-11-09 | End: 2022-11-09

## 2022-11-09 RX ORDER — AMLODIPINE BESYLATE 2.5 MG/1
1 TABLET ORAL
Qty: 0 | Refills: 0 | DISCHARGE

## 2022-11-09 RX ORDER — AMLODIPINE BESYLATE 2.5 MG/1
10 TABLET ORAL DAILY
Refills: 0 | Status: DISCONTINUED | OUTPATIENT
Start: 2022-11-10 | End: 2022-11-09

## 2022-11-09 RX ORDER — AMLODIPINE BESYLATE 2.5 MG/1
5 TABLET ORAL ONCE
Refills: 0 | Status: COMPLETED | OUTPATIENT
Start: 2022-11-09 | End: 2022-11-09

## 2022-11-09 RX ORDER — VALSARTAN 80 MG/1
12.5 TABLET ORAL
Qty: 0 | Refills: 0 | DISCHARGE

## 2022-11-09 RX ORDER — OMEGA-3 ACID ETHYL ESTERS 1 G
1 CAPSULE ORAL
Qty: 0 | Refills: 0 | DISCHARGE

## 2022-11-09 RX ORDER — CEPHALEXIN 500 MG
1 CAPSULE ORAL
Qty: 12 | Refills: 0
Start: 2022-11-09 | End: 2022-11-14

## 2022-11-09 RX ADMIN — VALSARTAN 160 MILLIGRAM(S): 80 TABLET ORAL at 06:03

## 2022-11-09 RX ADMIN — AMLODIPINE BESYLATE 5 MILLIGRAM(S): 2.5 TABLET ORAL at 12:10

## 2022-11-09 RX ADMIN — Medication 100 MILLIGRAM(S): at 12:29

## 2022-11-09 RX ADMIN — AMLODIPINE BESYLATE 5 MILLIGRAM(S): 2.5 TABLET ORAL at 06:03

## 2022-11-09 RX ADMIN — Medication 100 MILLIGRAM(S): at 06:04

## 2022-11-09 RX ADMIN — FAMOTIDINE 30 MILLIGRAM(S): 10 INJECTION INTRAVENOUS at 12:10

## 2022-11-09 RX ADMIN — CHLORHEXIDINE GLUCONATE 1 APPLICATION(S): 213 SOLUTION TOPICAL at 05:55

## 2022-11-09 NOTE — PROGRESS NOTE ADULT - SUBJECTIVE AND OBJECTIVE BOX
Electrophysiology Brief Post-Op Note    PRE-OP DIAGNOSIS: CHB    POST-OP DIAGNOSIS: CHB    PROCEDURE: PPM implant    Vendor Representative was present for clinical support.    Physician: Brian  Assistant: None    ESTIMATED BLOOD LOSS:    20   mL    ANESTHESIA TYPE:  [  ]General Anesthesia  [ X ] Sedation  [ X ] Local/Regional    CONDITION  [  ] Critical  [  ] Serious  [  ]Fair  [X  ]Good      SPECIMENS REMOVED (IF APPLICABLE):  none    IMPLANTS (IF APPLICABLE)  PPM    FINDINGS: none    COMPLICATIONS: none     PLAN OF CARE  - Ancef 1g IVq8 h  - Chest X-ray PA/Lat now and tomorrow am  - Device interrogation tomorrow in am  - DC all heparin produces and lovenox  - No anticoagulation unless recommended by EP attending                  
INTERVAL HPI/OVERNIGHT EVENTS:    Patient s/p DC  PPM implant  No event over night. Pt without complains    MEDICATIONS  (STANDING):  amLODIPine   Tablet 10 milliGRAM(s) Oral daily  chlorhexidine 2% Cloths 1 Application(s) Topical <User Schedule>  famotidine    Tablet 30 milliGRAM(s) Oral daily  latanoprost 0.005% Ophthalmic Solution 1 Drop(s) Both EYES at bedtime  omega-3-Acid Ethyl Esters 1 Gram(s) Oral daily  simvastatin 20 milliGRAM(s) Oral at bedtime  timolol 0.5% Solution 1 Drop(s) Both EYES daily  valsartan 160 milliGRAM(s) Oral daily    MEDICATIONS  (PRN):      Allergies    No Known Allergies    Intolerances          Vital Signs Last 24 Hrs  T(C): 35.6 (09 Nov 2022 08:07), Max: 36.7 (08 Nov 2022 14:55)  T(F): 96 (09 Nov 2022 08:07), Max: 98 (09 Nov 2022 05:07)  HR: 100 (09 Nov 2022 12:00) (69 - 101)  BP: 163/79 (09 Nov 2022 12:00) (145/65 - 189/82)  BP(mean): 113 (09 Nov 2022 08:07) (93 - 139)  RR: 18 (09 Nov 2022 12:00) (14 - 26)  SpO2: 97% (09 Nov 2022 08:07) (93% - 97%)    Parameters below as of 08 Nov 2022 21:40  Patient On (Oxygen Delivery Method): room air        GENERAL: In no apparent distress, well nourished, and hydrated.  HEART: Regular rate and rhythm; No murmurs, rubs, or gallops.  	Wound healing well; No hematoma; no bleeding  PULMONARY: Clear to auscultation and perfusion.  No rales, wheezing, or rhonchi bilaterally.  ABDOMEN: Soft, Nontender, Nondistended; Bowel sounds present  EXTREMITIES:  2+ Peripheral Pulses, No clubbing, cyanosis, or edema  NEUROLOGICAL: Grossly nonfocal    12 Lead ECG:   Ventricular Rate 70 BPM    Atrial Rate 85 BPM    QRS Duration 194 ms    Q-T Interval 494 ms    QTC Calculation(Bazett) 533 ms    R Axis -18 degrees    T Axis 103 degrees    Diagnosis Line Sinus rhythm and Ventricular-paced rhythm  Abnormal ECG    Confirmed by Jakob Mobley ([id number]) on 11/8/2022 12:43:23 PM (11-08-22 @ 08:30)  12 Lead ECG:   Ventricular Rate 39 BPM    Atrial Rate 39 BPM    P-R Interval 190 ms    QRS Duration 146 ms    Q-T Interval 502 ms    QTC Calculation(Bazett) 404 ms    P Axis 60 degrees    R Axis 63 degrees    T Axis -15 degrees    Diagnosis Line Normal sinus rhythm with 2nd degree A-V block with 2:1 A-V conduction  Non-specific intra-ventricular conduction block  Abnormal ECG    Confirmed by Clyde Castaneda ([id number]) on 11/8/2022 9:04:59 AM (11-07-22 @ 15:03)      RADIOLOGY & ADDITIONAL TESTS:  < from: Xray Chest 2 Views PA/Lat (11.09.22 @ 07:40) >  Findings:    Support devices: Left chest wall pacer device in place. Overlying EKG   leads.    Cardiac/mediastinum/hilum: Stable.    Lung parenchyma/Pleura: Within normal limits.    Skeleton/soft tissues: Stable.    Impression:    No radiographic evidence of acute cardiopulmonary disease.    < end of copied text >        A/P   Patient s/p ICD/ PPM/ BiV- ICD implant    - CXR as above, reviewed leads in appropriate positions   - Device interrogation done, awaiting review by attending  - Keflex 500 mg PO q12 h x 5 days    - FU in the EP office for wound check with ___12/9/22 @1:45pm Elena Torres office   [address] [place] [address], [address]  [phone]     No Heavy lifting >5 lbs. Do not raise your arm above shoulder level for 4-6 weeks.   No driving for 2weeks  No shower, bathtub, no wetting device site for 5 days.  Can take a shower on _Sun  _ , remove dressing at that time, leave Steri-strips in place

## 2022-11-09 NOTE — DISCHARGE NOTE PROVIDER - NSDCFUSCHEDAPPT_GEN_ALL_CORE_FT
Elena Tapia  [facility] Physician [facility]  CARDIOLOGY [address] [facility]  Scheduled Appointment: 12/09/2022

## 2022-11-09 NOTE — DISCHARGE NOTE PROVIDER - HOSPITAL COURSE
Patient is an 81 yo M, h/o high blood pressure, hyperlipidemia, on metoprolol who was sent in by cardiologist for complete heart block. Patient had a routine appointment with Dr. Mensah today, found to be in complete heart block, and sent to the ED. Patient reports having had generalized weakness and FLANNERY for several weeks, but did not think much of it. Patient reports feeling dizzy for the past few days. No CP, diaphoresis, nausea/vomiting. No palpitations.    Currently s/p successful DC PPM ([employer]).  AM CXR shows device in place with no pneumothorax noted.  Interrogation completed, device functioning properly.  Seen and examined, he is stable for discharge to home today.

## 2022-11-09 NOTE — DISCHARGE NOTE PROVIDER - NSDCMRMEDTOKEN_GEN_ALL_CORE_FT
amLODIPine 10 mg oral tablet: 1 tab(s) orally once a day MDD:1  cephalexin 500 mg oral capsule: 1 cap(s) orally every 12 hours     First dose tonight MDD:2  Folbic oral tablet: 1 tab(s) orally once a day  ketorolac 0.5% ophthalmic solution: 1 drop(s) to each affected eye 4 times a day (right eye)  latanoprost 0.005% ophthalmic solution: 1 drop(s) to each affected eye once a day  Lovaza 1000 mg oral capsule: 1 cap(s) orally once a day  metoprolol succinate 25 mg oral tablet, extended release: 0.5 tab(s) orally 2 times a day  prednisoLONE acetate 1% ophthalmic suspension: 1 drop(s) to each affected eye 4 times a day (right eye)  Prevacid 30 mg oral delayed release capsule: 1 cap(s) orally once a day  simvastatin 20 mg oral tablet: 1 tab(s) orally once a day (at bedtime)  timolol ophthalmic long-actin drop(s)  once a day  valsartan: 12.5 milligram(s) orally 2 times a day  valsartan 160 mg oral capsule: 1 cap(s) orally once a day   amLODIPine 10 mg oral tablet: 1 tab(s) orally once a day MDD:1  cephalexin 500 mg oral capsule: 1 cap(s) orally every 12 hours     First dose tonight MDD:2  Folbic oral tablet: 1 tab(s) orally once a day  ketorolac 0.5% ophthalmic solution: 1 drop(s) to each affected eye 4 times a day (right eye)  latanoprost 0.005% ophthalmic solution: 1 drop(s) to each affected eye once a day  Lovaza 1000 mg oral capsule: 1 cap(s) orally once a day  metoprolol succinate 25 mg oral tablet, extended release: 0.5 tab(s) orally 2 times a day  prednisoLONE acetate 1% ophthalmic suspension: 1 drop(s) to each affected eye 4 times a day (right eye)  Prevacid 30 mg oral delayed release capsule: 1 cap(s) orally once a day  simvastatin 20 mg oral tablet: 1 tab(s) orally once a day (at bedtime)  timolol ophthalmic long-actin drop(s)  once a day  valsartan 160 mg oral capsule: 1 cap(s) orally once a day

## 2022-11-09 NOTE — DISCHARGE NOTE PROVIDER - ATTENDING DISCHARGE PHYSICAL EXAMINATION:
I saw and evaluated the patient at bedside the day of discharge.  He is s/p PPM for CHB and is in stable condition.  Exam is notable for an implant site with mild tenderness, no hematoma.

## 2022-11-09 NOTE — DISCHARGE NOTE PROVIDER - NSDCCPTREATMENT_GEN_ALL_CORE_FT
PRINCIPAL PROCEDURE  Procedure: Insertion, electrode lead, permanent cardiac pacemaker, dual chamber, with coronary sinus electrode lead insertion  Findings and Treatment:   - Please take Keflex 500 mg (Bactrim / doxycycline 100 mg) twice daily for 6 days. First dose tonight.  - Do not shower for 5 days.  - Do not drive or operate heavy machinery for 1 week.  - Do not submerge in water (example: baths, swimming) for 1 month.  - Do not lift your left arm greater than shoulder height for 6 weeks.  - Do not lift anything heavier than 5-10 lbs with your left arm for 6 weeks.  - Any sudden swelling, redness, fever, discharge, or severe pain, call the Electrophysiology Office at [phone].

## 2022-11-09 NOTE — DISCHARGE NOTE NURSING/CASE MANAGEMENT/SOCIAL WORK - PATIENT PORTAL LINK FT
You can access the FollowMyHealth Patient Portal offered by [facility] by registering at the following website: [email or web address]. By joining [employer]’s FollowMyHealth portal, you will also be able to view your health information using other applications (apps) compatible with our system.

## 2022-11-11 LAB
AMPHET UR-MCNC: NEGATIVE — SIGNIFICANT CHANGE UP
BARBITURATES, URINE.: NEGATIVE — SIGNIFICANT CHANGE UP
BENZODIAZ UR-MCNC: NEGATIVE — SIGNIFICANT CHANGE UP
COCAINE METAB.OTHER UR-MCNC: NEGATIVE — SIGNIFICANT CHANGE UP
CREATININE, URINE THERAPEUTIC: 100.9 MG/DL — SIGNIFICANT CHANGE UP
METHADONE UR-MCNC: NEGATIVE — SIGNIFICANT CHANGE UP
METHAQUALONE UR QL: NEGATIVE — SIGNIFICANT CHANGE UP
METHAQUALONE UR-MCNC: NEGATIVE — SIGNIFICANT CHANGE UP
OPIATES UR-MCNC: NEGATIVE — SIGNIFICANT CHANGE UP
PCP UR-MCNC: NEGATIVE — SIGNIFICANT CHANGE UP
PROPOXYPH UR QL: NEGATIVE — SIGNIFICANT CHANGE UP
THC UR QL: NEGATIVE — SIGNIFICANT CHANGE UP

## 2022-11-28 ENCOUNTER — APPOINTMENT (OUTPATIENT)
Dept: CARDIOLOGY | Facility: CLINIC | Age: 82
End: 2022-11-28

## 2022-11-28 VITALS
HEART RATE: 33 BPM | DIASTOLIC BLOOD PRESSURE: 92 MMHG | WEIGHT: 180 LBS | SYSTOLIC BLOOD PRESSURE: 130 MMHG | BODY MASS INDEX: 26.66 KG/M2 | HEIGHT: 69 IN

## 2022-11-28 PROCEDURE — 93000 ELECTROCARDIOGRAM COMPLETE: CPT

## 2022-11-28 PROCEDURE — 99214 OFFICE O/P EST MOD 30 MIN: CPT | Mod: 25

## 2022-11-28 NOTE — DISCUSSION/SUMMARY
[Coronary Artery Disease] : coronary artery disease [Stable] : stable [Dietary Modification] : dietary modification [FreeTextEntry1] : Patient was advised to go to the ER directly if his symptoms worsen\par The patient will discontinue his metoprolol and timolol.\par He will need interrogation of his PPM which will be set up with Dr. Yi\par His lab test results were reviewed with him as well.\par RV in 3 weeks. [EKG obtained to assist in diagnosis and management of assessed problem(s)] : EKG obtained to assist in diagnosis and management of assessed problem(s)

## 2022-11-28 NOTE — REASON FOR VISIT
[FreeTextEntry1] : Patient is s/p placement of a PPM by Dr. Hernandez. He presents for a follow up visit.

## 2022-11-28 NOTE — ASSESSMENT
[FreeTextEntry1] : Bradycardia with a HR of 33 bpm\par Second degree AV block\par S/P PPM\par LBBB\par Hypertensive heart disease\par Hyperlipidemia\par Obesity has improved with a 50 lb. weight loss\par DM

## 2022-11-30 ENCOUNTER — APPOINTMENT (OUTPATIENT)
Dept: CARDIOLOGY | Facility: CLINIC | Age: 82
End: 2022-11-30

## 2022-11-30 VITALS
OXYGEN SATURATION: 97 % | HEART RATE: 60 BPM | WEIGHT: 180 LBS | HEIGHT: 69 IN | DIASTOLIC BLOOD PRESSURE: 80 MMHG | SYSTOLIC BLOOD PRESSURE: 148 MMHG | BODY MASS INDEX: 26.66 KG/M2

## 2022-11-30 PROCEDURE — 93000 ELECTROCARDIOGRAM COMPLETE: CPT | Mod: 59

## 2022-11-30 PROCEDURE — 99214 OFFICE O/P EST MOD 30 MIN: CPT | Mod: 24,25

## 2022-11-30 PROCEDURE — 93280 PM DEVICE PROGR EVAL DUAL: CPT

## 2022-11-30 RX ORDER — KETOCONAZOLE 20.5 MG/ML
2 SHAMPOO, SUSPENSION TOPICAL
Qty: 360 | Refills: 0 | Status: DISCONTINUED | COMMUNITY
Start: 2021-10-19 | End: 2022-11-30

## 2022-11-30 RX ORDER — AMLODIPINE BESYLATE 5 MG/1
5 TABLET ORAL DAILY
Qty: 90 | Refills: 3 | Status: DISCONTINUED | COMMUNITY
Start: 2021-04-12 | End: 2022-11-30

## 2022-11-30 RX ORDER — CEPHALEXIN 500 MG/1
500 CAPSULE ORAL
Qty: 12 | Refills: 0 | Status: DISCONTINUED | COMMUNITY
Start: 2022-11-09 | End: 2022-11-30

## 2022-11-30 RX ORDER — AMLODIPINE BESYLATE 10 MG/1
10 TABLET ORAL
Qty: 30 | Refills: 0 | Status: DISCONTINUED | COMMUNITY
Start: 2022-11-09 | End: 2022-11-30

## 2022-12-06 NOTE — ADDENDUM
[FreeTextEntry1] : Mar TURNER assisted in documentation on 11/30/2022 acting as a scribe for Dr. Pop Clements.\par

## 2022-12-06 NOTE — ASSESSMENT
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 Battery life ok. No new events.\par - RV  threshold increased from implant. Discussed with Dr. Torres, we will continue to monitor. Educated patient. If there is any signs of dizziness, fall, or loss of consciousness patient should go to emergency room or contact us right away.\par - BP controlled. \par - Continue Amlodipine.\par - Remote monitoring will be scheduled once remote monitor is available. However, it will not check threshold as per [employer] rec due to high threshold. \par - Return in 2 weeks with Dr. Torres. \par

## 2022-12-06 NOTE — HISTORY OF PRESENT ILLNESS
[de-identified] : Mr. Galeano is a 82 year old male with  Hx of hypertension, dyslipidemia, LBBB, type 2 AV block, s/p DC-PPM (BSC, 22, dependent), is here for device care. \par \par Episodes of dizziness. Heart rate was found in 30s by patient's visiting nurse and was concerning. \par \par Denies chest pain, shortness of breath, palpitation, dizziness or LOC except noted above.\par \par EKG (11/30/22): SR- @ 60, \par Cardio: Dr. Mensah\par

## 2022-12-06 NOTE — PHYSICAL EXAM
[General Appearance - Well Developed] : well developed [Normal Appearance] : normal appearance [Well Groomed] : well groomed [General Appearance - Well Nourished] : well nourished [No Deformities] : no deformities [General Appearance - In No Acute Distress] : no acute distress [Heart Rate And Rhythm] : heart rate and rhythm were normal [Heart Sounds] : normal S1 and S2 [Murmurs] : no murmurs present [Clean] : clean [Dry] : dry [Healing Well] : healing well [FreeTextEntry2] : Device Study Strips Intact

## 2022-12-06 NOTE — PROCEDURE
[Complete Heart Block] : complete heart block [Pacemaker] : pacemaker [DDD] : DDD [Longevity: ___ months] : The estimated remaining battery life is [unfilled] months [Threshold Testing Performed] : Threshold testing was performed [Lead Imp:  ___ohms] : lead impedance was [unfilled] ohms [Sensing Amplitude ___mv] : sensing amplitude was [unfilled] mv [___V @] : [unfilled] V [___ ms] : [unfilled] ms [Outputs/Safety Margin] : output changed to allow for adequate safety margin [Programmed for Longevity] : output reprogrammed for improved battery longevity [Asense-Vpace ___ %] : Asense-Vpace [unfilled]% [Apace-Vpace ___ %] : Apace-Vpace [unfilled]% [de-identified] : [place] [employer] [de-identified] : L311 [de-identified] : [id number] [de-identified] : 11/08/2022 [de-identified] :  [de-identified] : RV lead output adjusted from 3.5 V @ 0.4 ms to 4.0 V @ 0.9 ms due to high RV threshold. [de-identified] : RV threshold cherie 3 V since implant. Bipolar: Intermittent at 3.5 V. Unipolar: 2.0 V @ 0.9 ms. Increased output to ensure capture. Pt reports slow HR and SOB in the last couple weeks.\par Home monitor was ordered 11/8/22.

## 2022-12-15 ENCOUNTER — APPOINTMENT (OUTPATIENT)
Dept: CARDIOLOGY | Facility: CLINIC | Age: 82
End: 2022-12-15

## 2022-12-15 VITALS
WEIGHT: 173 LBS | BODY MASS INDEX: 25.62 KG/M2 | HEIGHT: 69 IN | DIASTOLIC BLOOD PRESSURE: 80 MMHG | TEMPERATURE: 97.1 F | RESPIRATION RATE: 16 BRPM | SYSTOLIC BLOOD PRESSURE: 130 MMHG | HEART RATE: 60 BPM

## 2022-12-15 DIAGNOSIS — Z45.09 ENCOUNTER FOR ADJUSTMENT AND MANAGEMENT OF OTHER CARDIAC DEVICE: ICD-10-CM

## 2022-12-15 PROCEDURE — 93280 PM DEVICE PROGR EVAL DUAL: CPT

## 2022-12-15 PROCEDURE — 99214 OFFICE O/P EST MOD 30 MIN: CPT

## 2022-12-19 ENCOUNTER — APPOINTMENT (OUTPATIENT)
Dept: CARDIOLOGY | Facility: CLINIC | Age: 82
End: 2022-12-19

## 2022-12-19 VITALS
DIASTOLIC BLOOD PRESSURE: 60 MMHG | WEIGHT: 173 LBS | BODY MASS INDEX: 25.62 KG/M2 | HEIGHT: 69 IN | SYSTOLIC BLOOD PRESSURE: 120 MMHG

## 2022-12-19 PROCEDURE — 99214 OFFICE O/P EST MOD 30 MIN: CPT

## 2022-12-19 NOTE — REASON FOR VISIT
[FreeTextEntry1] : Patient is s/p placement of a PPM by Dr. Hernandez. He presents for a follow up visit. His PPM settings were adjusted. His HR is now 60 bpm.

## 2022-12-19 NOTE — ASSESSMENT
[FreeTextEntry1] : ASHD\par S/P PPM\par LBBB\par Hypertensive heart disease\par Hyperlipidemia\par Obesity has improved with a 50 lb. weight loss\par DM

## 2022-12-19 NOTE — DISCUSSION/SUMMARY
[Coronary Artery Disease] : coronary artery disease [Stable] : stable [Dietary Modification] : dietary modification [FreeTextEntry1] : Patient was advised to continue with risk factor modification.\par The patient is advised to continue with his medications.\par EKG: Fully paced beats on EKG 12/15/22\par CBC BMP lipid and hepatic panel.\par RV in 6 months.

## 2022-12-20 NOTE — CARDIOLOGY SUMMARY
[de-identified] : 12/15/2022: AV dual-paced rhythm   HR 60 bpm [de-identified] : 11/8/2022: LVEF 64%, abnormal septal motion consistent with RV pacemaker, mild thickening and calcification of the anterior and posterior MV leaflets, mod mitral annular calcification, trace MVR, normal left/right atrial size.

## 2022-12-20 NOTE — HISTORY OF PRESENT ILLNESS
[de-identified] : 82 Y.O. male with  a PMHx of hypertension, dyslipidemia, LBBB, type 2 AV block, s/p DC-PPM (BSC, 22, dependent), is here for device f/u.\par \par Denies chest pain, shortness of breath, palpitation, dizziness or LOC.\par \par EKG 12/15/2022: SR, LBBB, AV dual-paced rhythm   HR 60bpm\par EKG (11/30/22): SR- @ 60, \par Cardio: Dr. Mensah\par

## 2022-12-20 NOTE — PHYSICAL EXAM
[General Appearance - Well Developed] : well developed [Normal Appearance] : normal appearance [Well Groomed] : well groomed [General Appearance - Well Nourished] : well nourished [No Deformities] : no deformities [General Appearance - In No Acute Distress] : no acute distress [Heart Rate And Rhythm] : heart rate and rhythm were normal [Heart Sounds] : normal S1 and S2 [Murmurs] : no murmurs present [Well-Healed] : well-healed [Abdomen Soft] : soft [Abdomen Tenderness] : non-tender [] : no hepato-splenomegaly [Abdomen Mass (___ Cm)] : no abdominal mass palpated [FreeTextEntry2] : Device Study Strips Intact

## 2022-12-20 NOTE — PROCEDURE
[Complete Heart Block] : complete heart block [Pacemaker] : pacemaker [DDD] : DDD [Longevity: ___ months] : The estimated remaining battery life is [unfilled] months [Threshold Testing Performed] : Threshold testing was performed [Lead Imp:  ___ohms] : lead impedance was [unfilled] ohms [Sensing Amplitude ___mv] : sensing amplitude was [unfilled] mv [___V @] : [unfilled] V [___ ms] : [unfilled] ms [Programmed for Longevity] : output reprogrammed for improved battery longevity [Counters Reset] : the counters were reset [Asense-Vpace ___ %] : Asense-Vpace [unfilled]% [Apace-Vpace ___ %] : Apace-Vpace [unfilled]% [de-identified] : [place] Sci [de-identified] : L311 [de-identified] : [id number] [de-identified] : 11/08/2022 [de-identified] :  [de-identified] : No events.\par Threshold has remained stable.\par Patient set up with a home monitor today.

## 2022-12-20 NOTE — ASSESSMENT
[FreeTextEntry1] : 82 Y.O. 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 Battery life ok. No new events.\par - Patient was set up in office for remote monitoring \par \par ## Hypertension \par - BP controlled. Continue Amlodipine 10mg QD\par - Low sodium/sat diet encouraged\par \par

## 2023-03-16 ENCOUNTER — APPOINTMENT (OUTPATIENT)
Dept: CARDIOLOGY | Facility: CLINIC | Age: 83
End: 2023-03-16
Payer: MEDICARE

## 2023-03-16 ENCOUNTER — NON-APPOINTMENT (OUTPATIENT)
Age: 83
End: 2023-03-16

## 2023-03-16 PROCEDURE — 93296 REM INTERROG EVL PM/IDS: CPT

## 2023-03-16 PROCEDURE — 93294 REM INTERROG EVL PM/LDLS PM: CPT

## 2023-06-15 ENCOUNTER — APPOINTMENT (OUTPATIENT)
Dept: ELECTROPHYSIOLOGY | Facility: CLINIC | Age: 83
End: 2023-06-15

## 2023-06-22 ENCOUNTER — NON-APPOINTMENT (OUTPATIENT)
Age: 83
End: 2023-06-22

## 2023-06-22 ENCOUNTER — APPOINTMENT (OUTPATIENT)
Dept: ELECTROPHYSIOLOGY | Facility: CLINIC | Age: 83
End: 2023-06-22
Payer: MEDICARE

## 2023-06-22 DIAGNOSIS — I47.29 OTHER VENTRICULAR TACHYCARDIA: ICD-10-CM

## 2023-06-22 PROCEDURE — 93280 PM DEVICE PROGR EVAL DUAL: CPT

## 2023-06-28 ENCOUNTER — APPOINTMENT (OUTPATIENT)
Dept: CARDIOLOGY | Facility: CLINIC | Age: 83
End: 2023-06-28
Payer: MEDICARE

## 2023-06-28 VITALS
WEIGHT: 178 LBS | HEIGHT: 69 IN | HEART RATE: 60 BPM | BODY MASS INDEX: 26.36 KG/M2 | SYSTOLIC BLOOD PRESSURE: 130 MMHG | DIASTOLIC BLOOD PRESSURE: 78 MMHG

## 2023-06-28 PROCEDURE — 99214 OFFICE O/P EST MOD 30 MIN: CPT | Mod: 25

## 2023-06-28 PROCEDURE — 93000 ELECTROCARDIOGRAM COMPLETE: CPT

## 2023-06-28 NOTE — DISCUSSION/SUMMARY
[FreeTextEntry1] : Patient was advised to continue with risk factor modification.\par Lab test results were reviewed with the patient.\par The patient is advised to continue with his medications.\par EKG: Fully paced beats with a rate of 60 bpm\par CBC BMP lipid and hepatic panel.\par RV in 6 months.

## 2023-08-02 ENCOUNTER — RX RENEWAL (OUTPATIENT)
Age: 83
End: 2023-08-02

## 2023-09-14 ENCOUNTER — NON-APPOINTMENT (OUTPATIENT)
Age: 83
End: 2023-09-14

## 2023-09-14 ENCOUNTER — APPOINTMENT (OUTPATIENT)
Dept: CARDIOLOGY | Facility: CLINIC | Age: 83
End: 2023-09-14
Payer: MEDICARE

## 2023-09-14 PROCEDURE — 93296 REM INTERROG EVL PM/IDS: CPT

## 2023-09-14 PROCEDURE — 93294 REM INTERROG EVL PM/LDLS PM: CPT

## 2023-12-14 ENCOUNTER — APPOINTMENT (OUTPATIENT)
Dept: CARDIOLOGY | Facility: CLINIC | Age: 83
End: 2023-12-14
Payer: MEDICARE

## 2023-12-14 ENCOUNTER — APPOINTMENT (OUTPATIENT)
Dept: ELECTROPHYSIOLOGY | Facility: CLINIC | Age: 83
End: 2023-12-14
Payer: MEDICARE

## 2023-12-14 VITALS
HEART RATE: 60 BPM | HEIGHT: 69 IN | WEIGHT: 175 LBS | DIASTOLIC BLOOD PRESSURE: 70 MMHG | SYSTOLIC BLOOD PRESSURE: 120 MMHG | BODY MASS INDEX: 25.92 KG/M2

## 2023-12-14 VITALS — WEIGHT: 179 LBS | HEIGHT: 69 IN | BODY MASS INDEX: 26.51 KG/M2

## 2023-12-14 DIAGNOSIS — Z45.018 ENCOUNTER FOR ADJUSTMENT AND MANAGEMENT OF OTHER PART OF CARDIAC PACEMAKER: ICD-10-CM

## 2023-12-14 DIAGNOSIS — R00.1 BRADYCARDIA, UNSPECIFIED: ICD-10-CM

## 2023-12-14 DIAGNOSIS — I25.10 ATHEROSCLEROTIC HEART DISEASE OF NATIVE CORONARY ARTERY W/OUT ANGINA PECTORIS: ICD-10-CM

## 2023-12-14 PROCEDURE — 99213 OFFICE O/P EST LOW 20 MIN: CPT

## 2023-12-14 PROCEDURE — 93000 ELECTROCARDIOGRAM COMPLETE: CPT

## 2023-12-14 PROCEDURE — 99214 OFFICE O/P EST MOD 30 MIN: CPT | Mod: 25

## 2023-12-14 PROCEDURE — 93280 PM DEVICE PROGR EVAL DUAL: CPT

## 2023-12-14 RX ORDER — VALSARTAN 160 MG/1
160 TABLET, COATED ORAL DAILY
Refills: 0 | Status: ACTIVE | COMMUNITY

## 2023-12-14 RX ORDER — OMEGA-3-ACID ETHYL ESTERS 1 G/1
1 CAPSULE, LIQUID FILLED ORAL DAILY
Refills: 0 | Status: ACTIVE | COMMUNITY

## 2023-12-14 RX ORDER — SIMVASTATIN 20 MG/1
20 TABLET, FILM COATED ORAL
Qty: 30 | Refills: 0 | Status: ACTIVE | COMMUNITY

## 2023-12-14 RX ORDER — AMLODIPINE BESYLATE 10 MG/1
10 TABLET ORAL DAILY
Qty: 90 | Refills: 3 | Status: ACTIVE | COMMUNITY
Start: 2023-08-02

## 2023-12-14 RX ORDER — TIMOLOL MALEATE 5 MG/ML
0.5 SOLUTION OPHTHALMIC
Refills: 0 | Status: ACTIVE | COMMUNITY

## 2023-12-14 RX ORDER — LANSOPRAZOLE 30 MG/1
30 CAPSULE, DELAYED RELEASE ORAL DAILY
Refills: 0 | Status: ACTIVE | COMMUNITY
Start: 2023-06-22

## 2023-12-14 RX ORDER — FOLIC ACID 1 MG/1
1 TABLET ORAL DAILY
Refills: 0 | Status: ACTIVE | COMMUNITY
Start: 2023-06-22

## 2023-12-14 RX ORDER — LATANOPROST/PF 0.005 %
0.01 DROPS OPHTHALMIC (EYE)
Refills: 0 | Status: ACTIVE | COMMUNITY

## 2023-12-14 RX ORDER — METOPROLOL TARTRATE 25 MG/1
25 TABLET, FILM COATED ORAL TWICE DAILY
Qty: 90 | Refills: 3 | Status: ACTIVE | COMMUNITY

## 2023-12-14 NOTE — CARDIOLOGY SUMMARY
[de-identified] : 12/15/2022: AV dual-paced rhythm   HR 60 bpm [de-identified] : 11/8/2022: LVEF 64%, abnormal septal motion consistent with RV pacemaker, mild thickening and calcification of the anterior and posterior MV leaflets, mod mitral annular calcification, trace MVR, normal left/right atrial size.

## 2023-12-14 NOTE — PROCEDURE
[Complete Heart Block] : complete heart block [Pacemaker] : pacemaker [DDD] : DDD [Longevity: ___ months] : The estimated remaining battery life is [unfilled] months [Threshold Testing Performed] : Threshold testing was performed [Lead Imp:  ___ohms] : lead impedance was [unfilled] ohms [Sensing Amplitude ___mv] : sensing amplitude was [unfilled] mv [___V @] : [unfilled] V [___ ms] : [unfilled] ms [Programmed for Longevity] : output reprogrammed for improved battery longevity [Counters Reset] : the counters were reset [Asense-Vpace ___ %] : Asense-Vpace [unfilled]% [Apace-Vpace ___ %] : Apace-Vpace [unfilled]% [de-identified] : [place] Sci [de-identified] : L311 [de-identified] : [id number] [de-identified] : 11/08/2022 [de-identified] :  [de-identified] : Rate response added [de-identified] : No events.\par  Threshold has remained stable.\par  Patient set up with a home monitor today.

## 2023-12-14 NOTE — ASSESSMENT
[FreeTextEntry1] : 83 Y.O. male with a PMHx of hypertension, dyslipidemia, LBBB, type 2 AV block, s/p DC-PPM (BSC, 22, dependent), is here for device f/u.  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 Battery life ok. No new events.  - Rate response added for FLANNERY when climbing stairs.   - Battery life improved since LOV since A and V on auto.   RTO in 9 months

## 2023-12-14 NOTE — HISTORY OF PRESENT ILLNESS
[de-identified] : 82 Y.O. male with  a PMHx of hypertension, dyslipidemia, LBBB, type 2 AV block, s/p DC-PPM (BSC, 22, dependent), is here for device f/u.\par  \par  Denies chest pain, shortness of breath, palpitation, dizziness or LOC.\par  \par  EKG 12/15/2022: SR, LBBB, AV dual-paced rhythm   HR 60bpm\par  EKG (11/30/22): SR- @ 60, \par  Cardio: Dr. Mensah\par

## 2023-12-14 NOTE — DISCUSSION/SUMMARY
[FreeTextEntry1] : Patient was advised to continue with risk factor modification.\par  Lab test results were reviewed with the patient.\par  The patient is advised to continue with his medications.\par  EKG: Fully paced beats with a rate of 60 bpm\par  CBC BMP lipid and hepatic panel.\par  RV in 6 months. [EKG obtained to assist in diagnosis and management of assessed problem(s)] : EKG obtained to assist in diagnosis and management of assessed problem(s)

## 2023-12-14 NOTE — REASON FOR VISIT
[FreeTextEntry1] : Patient is s/p placement of a PPM by Dr. Hernandez. He presents for a follow up visit. His PPM settings were adjusted. His HR is now 60 bpm. He denies any cardiac symptoms.

## 2023-12-14 NOTE — ASSESSMENT
[FreeTextEntry1] : ASHD  S/P PPM  LBBB  Hypertensive heart disease  Hyperlipidemia  Obesity has improved with a 50 lb. weight loss  DM.

## 2024-03-05 NOTE — ED ADULT TRIAGE NOTE - WEIGHT IN KG
Spoke to mother of patient who states that she will call back to the office and will schedule appointment.    88.5

## 2024-03-13 ENCOUNTER — NON-APPOINTMENT (OUTPATIENT)
Age: 84
End: 2024-03-13

## 2024-03-14 ENCOUNTER — APPOINTMENT (OUTPATIENT)
Dept: CARDIOLOGY | Facility: CLINIC | Age: 84
End: 2024-03-14
Payer: MEDICARE

## 2024-03-14 PROCEDURE — 93296 REM INTERROG EVL PM/IDS: CPT

## 2024-03-14 PROCEDURE — 93294 REM INTERROG EVL PM/LDLS PM: CPT

## 2024-06-12 ENCOUNTER — NON-APPOINTMENT (OUTPATIENT)
Age: 84
End: 2024-06-12

## 2024-06-13 ENCOUNTER — APPOINTMENT (OUTPATIENT)
Dept: CARDIOLOGY | Facility: CLINIC | Age: 84
End: 2024-06-13
Payer: MEDICARE

## 2024-06-13 VITALS
SYSTOLIC BLOOD PRESSURE: 120 MMHG | WEIGHT: 179 LBS | HEART RATE: 62 BPM | BODY MASS INDEX: 26.51 KG/M2 | HEIGHT: 69 IN | DIASTOLIC BLOOD PRESSURE: 60 MMHG

## 2024-06-13 DIAGNOSIS — I11.9 HYPERTENSIVE HEART DISEASE W/OUT HEART FAILURE: ICD-10-CM

## 2024-06-13 DIAGNOSIS — E78.5 HYPERLIPIDEMIA, UNSPECIFIED: ICD-10-CM

## 2024-06-13 DIAGNOSIS — I10 ESSENTIAL (PRIMARY) HYPERTENSION: ICD-10-CM

## 2024-06-13 DIAGNOSIS — I44.7 LEFT BUNDLE-BRANCH BLOCK, UNSPECIFIED: ICD-10-CM

## 2024-06-13 PROCEDURE — 93294 REM INTERROG EVL PM/LDLS PM: CPT

## 2024-06-13 PROCEDURE — 93296 REM INTERROG EVL PM/IDS: CPT

## 2024-06-13 PROCEDURE — 93000 ELECTROCARDIOGRAM COMPLETE: CPT

## 2024-06-13 PROCEDURE — 99214 OFFICE O/P EST MOD 30 MIN: CPT | Mod: 25

## 2024-06-13 NOTE — REASON FOR VISIT
[FreeTextEntry1] : Patient is s/p placement of a PPM by Dr. Hernandez. He presents for a follow up visit. His PPM settings were adjusted.  He denies any cardiac symptoms.

## 2024-09-12 ENCOUNTER — APPOINTMENT (OUTPATIENT)
Dept: ELECTROPHYSIOLOGY | Facility: CLINIC | Age: 84
End: 2024-09-12
Payer: MEDICARE

## 2024-09-12 VITALS
BODY MASS INDEX: 26.66 KG/M2 | HEART RATE: 60 BPM | WEIGHT: 180 LBS | DIASTOLIC BLOOD PRESSURE: 64 MMHG | HEIGHT: 69 IN | SYSTOLIC BLOOD PRESSURE: 145 MMHG

## 2024-09-12 DIAGNOSIS — I10 ESSENTIAL (PRIMARY) HYPERTENSION: ICD-10-CM

## 2024-09-12 DIAGNOSIS — Z45.018 ENCOUNTER FOR ADJUSTMENT AND MANAGEMENT OF OTHER PART OF CARDIAC PACEMAKER: ICD-10-CM

## 2024-09-12 DIAGNOSIS — I44.7 LEFT BUNDLE-BRANCH BLOCK, UNSPECIFIED: ICD-10-CM

## 2024-09-12 DIAGNOSIS — I47.29 OTHER VENTRICULAR TACHYCARDIA: ICD-10-CM

## 2024-09-12 DIAGNOSIS — R00.1 BRADYCARDIA, UNSPECIFIED: ICD-10-CM

## 2024-09-12 PROCEDURE — 93280 PM DEVICE PROGR EVAL DUAL: CPT

## 2024-09-12 PROCEDURE — 99214 OFFICE O/P EST MOD 30 MIN: CPT | Mod: 25

## 2024-09-12 PROCEDURE — 93000 ELECTROCARDIOGRAM COMPLETE: CPT | Mod: 59

## 2024-09-12 NOTE — CARDIOLOGY SUMMARY
[de-identified] : 9/12/2024: AV paced 60 bpm, QRS 194ms 12/15/2022: AV dual-paced rhythm   HR 60 bpm [de-identified] : 11/8/2022: LVEF 64%, abnormal septal motion consistent with RV pacemaker, mild thickening and calcification of the anterior and posterior MV leaflets, mod mitral annular calcification, trace MVR, normal left/right atrial size.

## 2024-09-12 NOTE — HISTORY OF PRESENT ILLNESS
[de-identified] : 84 Y.O. male with  a PMHx of hypertension, dyslipidemia, LBBB, type 2 AV block, s/p DC-PPM (BSC, 22, dependent), is here for device f/u.  The patient is feeling well and has no cardiac complaints. Denies CP, palpitations, SOB, dizziness, FLANNERY, PND, syncope.   EKG 12/15/2022: SR, LBBB, AV dual-paced rhythm   HR 60bpm EKG (11/30/22): SR- @ 60,  Cardio: Dr. Mensah

## 2024-09-12 NOTE — PHYSICAL EXAM
[Normal Appearance] : normal appearance [General Appearance - Well Developed] : well developed [Well Groomed] : well groomed [General Appearance - Well Nourished] : well nourished [No Deformities] : no deformities [General Appearance - In No Acute Distress] : no acute distress [Heart Rate And Rhythm] : heart rate and rhythm were normal [Heart Sounds] : normal S1 and S2 [Murmurs] : no murmurs present [Well-Healed] : well-healed [Abdomen Soft] : soft [] : no respiratory distress [Nail Clubbing] : no clubbing of the fingernails [Cyanosis, Localized] : no localized cyanosis

## 2024-09-12 NOTE — PROCEDURE
[Complete Heart Block] : complete heart block [Pacemaker] : pacemaker [Longevity: ___ months] : The estimated remaining battery life is [unfilled] months [Threshold Testing Performed] : Threshold testing was performed [Lead Imp:  ___ohms] : lead impedance was [unfilled] ohms [Sensing Amplitude ___mv] : sensing amplitude was [unfilled] mv [___V @] : [unfilled] V [___ ms] : [unfilled] ms [Programmed for Longevity] : output reprogrammed for improved battery longevity [Counters Reset] : the counters were reset [Asense-Vpace ___ %] : Asense-Vpace [unfilled]% [Apace-Vpace ___ %] : Apace-Vpace [unfilled]% [DDDR] : DDDR [de-identified] : [place] [employer] [de-identified] : L311 [de-identified] : [id number] [de-identified] : 11/08/2022 [de-identified] :  [de-identified] : 4.5 years [de-identified] : 2 NSVT events, longest 16 beats @ 164 bpm

## 2024-09-12 NOTE — ASSESSMENT
[FreeTextEntry1] : 83 Y.O. male with a PMHx of hypertension, dyslipidemia, LBBB, type 2 AV block, s/p DC-PPM (BSC, 22, dependent), is here for device f/u.  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 I interrogated and reprogrammed the device as described above.  - 2 NSVT events as described above. No recent ischemic w/u. 2D echo ordered. - FLANNERY improved after adding rate response LOV.  - The patient is on remote and transmitting.  - QRS 194ms, however patient is dependent. Consider upgrade to CRT pending ischemic w/u.  # NSVT - Continue metoprolol tartrate 12.5mg BID. - I recommend ischemic w/u. Will start with echo.   # HTN - BP mildly elevated. - Continue amlodipine 10mg and Valsartan 160mg QD. F/u cardiologist.   RTO in 3-4 months with Dr. Torres

## 2024-09-19 ENCOUNTER — APPOINTMENT (OUTPATIENT)
Dept: CARDIOLOGY | Facility: CLINIC | Age: 84
End: 2024-09-19
Payer: MEDICARE

## 2024-09-19 PROCEDURE — 93306 TTE W/DOPPLER COMPLETE: CPT

## 2024-10-09 ENCOUNTER — RESULT REVIEW (OUTPATIENT)
Age: 84
End: 2024-10-09

## 2024-10-09 ENCOUNTER — OUTPATIENT (OUTPATIENT)
Dept: OUTPATIENT SERVICES | Facility: HOSPITAL | Age: 84
LOS: 1 days | End: 2024-10-09
Payer: MEDICARE

## 2024-10-09 DIAGNOSIS — Z98.890 OTHER SPECIFIED POSTPROCEDURAL STATES: Chronic | ICD-10-CM

## 2024-10-09 DIAGNOSIS — Z90.49 ACQUIRED ABSENCE OF OTHER SPECIFIED PARTS OF DIGESTIVE TRACT: Chronic | ICD-10-CM

## 2024-10-09 DIAGNOSIS — Z00.8 ENCOUNTER FOR OTHER GENERAL EXAMINATION: ICD-10-CM

## 2024-10-09 DIAGNOSIS — I47.29 OTHER VENTRICULAR TACHYCARDIA: ICD-10-CM

## 2024-10-09 PROCEDURE — A9500: CPT

## 2024-10-09 PROCEDURE — 93018 CV STRESS TEST I&R ONLY: CPT

## 2024-10-09 PROCEDURE — 78452 HT MUSCLE IMAGE SPECT MULT: CPT | Mod: 26

## 2024-10-09 PROCEDURE — 78452 HT MUSCLE IMAGE SPECT MULT: CPT

## 2024-10-10 DIAGNOSIS — I47.29 OTHER VENTRICULAR TACHYCARDIA: ICD-10-CM

## 2024-12-12 ENCOUNTER — NON-APPOINTMENT (OUTPATIENT)
Age: 84
End: 2024-12-12

## 2024-12-12 ENCOUNTER — APPOINTMENT (OUTPATIENT)
Dept: CARDIOLOGY | Facility: CLINIC | Age: 84
End: 2024-12-12
Payer: MEDICARE

## 2024-12-12 VITALS
SYSTOLIC BLOOD PRESSURE: 140 MMHG | WEIGHT: 180 LBS | HEIGHT: 69 IN | HEART RATE: 62 BPM | BODY MASS INDEX: 26.66 KG/M2 | DIASTOLIC BLOOD PRESSURE: 90 MMHG

## 2024-12-12 DIAGNOSIS — I25.10 ATHEROSCLEROTIC HEART DISEASE OF NATIVE CORONARY ARTERY W/OUT ANGINA PECTORIS: ICD-10-CM

## 2024-12-12 DIAGNOSIS — E78.5 HYPERLIPIDEMIA, UNSPECIFIED: ICD-10-CM

## 2024-12-12 DIAGNOSIS — I44.7 LEFT BUNDLE-BRANCH BLOCK, UNSPECIFIED: ICD-10-CM

## 2024-12-12 PROCEDURE — 93296 REM INTERROG EVL PM/IDS: CPT

## 2024-12-12 PROCEDURE — 93294 REM INTERROG EVL PM/LDLS PM: CPT

## 2024-12-12 PROCEDURE — 99214 OFFICE O/P EST MOD 30 MIN: CPT | Mod: 25

## 2024-12-12 PROCEDURE — 93000 ELECTROCARDIOGRAM COMPLETE: CPT

## 2024-12-19 ENCOUNTER — APPOINTMENT (OUTPATIENT)
Dept: ELECTROPHYSIOLOGY | Facility: CLINIC | Age: 84
End: 2024-12-19

## 2024-12-19 VITALS
BODY MASS INDEX: 26.66 KG/M2 | DIASTOLIC BLOOD PRESSURE: 60 MMHG | WEIGHT: 180 LBS | SYSTOLIC BLOOD PRESSURE: 150 MMHG | HEIGHT: 69 IN | HEART RATE: 60 BPM

## 2024-12-19 DIAGNOSIS — R00.1 BRADYCARDIA, UNSPECIFIED: ICD-10-CM

## 2024-12-19 DIAGNOSIS — I10 ESSENTIAL (PRIMARY) HYPERTENSION: ICD-10-CM

## 2024-12-19 PROCEDURE — 99214 OFFICE O/P EST MOD 30 MIN: CPT

## 2024-12-19 PROCEDURE — 93280 PM DEVICE PROGR EVAL DUAL: CPT

## 2024-12-19 PROCEDURE — G2211 COMPLEX E/M VISIT ADD ON: CPT

## 2025-03-20 ENCOUNTER — APPOINTMENT (OUTPATIENT)
Dept: CARDIOLOGY | Facility: CLINIC | Age: 85
End: 2025-03-20

## 2025-03-20 PROCEDURE — 93294 REM INTERROG EVL PM/LDLS PM: CPT

## 2025-03-20 PROCEDURE — 93296 REM INTERROG EVL PM/IDS: CPT

## 2025-06-12 ENCOUNTER — APPOINTMENT (OUTPATIENT)
Dept: CARDIOLOGY | Facility: CLINIC | Age: 85
End: 2025-06-12
Payer: MEDICARE

## 2025-06-12 VITALS
HEIGHT: 69 IN | HEART RATE: 64 BPM | SYSTOLIC BLOOD PRESSURE: 136 MMHG | BODY MASS INDEX: 46.65 KG/M2 | WEIGHT: 315 LBS | DIASTOLIC BLOOD PRESSURE: 60 MMHG

## 2025-06-12 PROCEDURE — 93000 ELECTROCARDIOGRAM COMPLETE: CPT

## 2025-06-12 PROCEDURE — 99214 OFFICE O/P EST MOD 30 MIN: CPT | Mod: 25

## 2025-06-20 ENCOUNTER — APPOINTMENT (OUTPATIENT)
Dept: ELECTROPHYSIOLOGY | Facility: CLINIC | Age: 85
End: 2025-06-20

## 2025-06-20 VITALS — DIASTOLIC BLOOD PRESSURE: 53 MMHG | HEART RATE: 64 BPM | SYSTOLIC BLOOD PRESSURE: 149 MMHG

## 2025-06-20 VITALS — WEIGHT: 180 LBS | BODY MASS INDEX: 26.66 KG/M2 | HEIGHT: 69 IN

## 2025-06-20 PROCEDURE — 93280 PM DEVICE PROGR EVAL DUAL: CPT

## 2025-06-20 PROCEDURE — 99213 OFFICE O/P EST LOW 20 MIN: CPT

## 2025-06-20 PROCEDURE — G2211 COMPLEX E/M VISIT ADD ON: CPT

## 2025-07-02 ENCOUNTER — APPOINTMENT (OUTPATIENT)
Dept: CARDIOLOGY | Facility: CLINIC | Age: 85
End: 2025-07-02

## 2025-07-02 PROCEDURE — 93880 EXTRACRANIAL BILAT STUDY: CPT

## 2025-07-17 ENCOUNTER — NON-APPOINTMENT (OUTPATIENT)
Age: 85
End: 2025-07-17

## 2025-07-17 ENCOUNTER — APPOINTMENT (OUTPATIENT)
Dept: CARDIOLOGY | Facility: CLINIC | Age: 85
End: 2025-07-17
Payer: MEDICARE

## 2025-07-17 VITALS
BODY MASS INDEX: 26.66 KG/M2 | DIASTOLIC BLOOD PRESSURE: 60 MMHG | HEIGHT: 69 IN | WEIGHT: 180 LBS | SYSTOLIC BLOOD PRESSURE: 140 MMHG

## 2025-07-17 PROBLEM — Z95.0 S/P PLACEMENT OF CARDIAC PACEMAKER: Status: ACTIVE | Noted: 2025-07-17

## 2025-07-17 PROBLEM — I44.30 ATRIOVENTRICULAR BLOCK: Status: ACTIVE | Noted: 2025-07-12

## 2025-07-17 PROCEDURE — 99214 OFFICE O/P EST MOD 30 MIN: CPT

## 2025-09-19 ENCOUNTER — NON-APPOINTMENT (OUTPATIENT)
Age: 85
End: 2025-09-19

## 2025-09-19 ENCOUNTER — APPOINTMENT (OUTPATIENT)
Dept: CARDIOLOGY | Facility: CLINIC | Age: 85
End: 2025-09-19
Payer: MEDICARE

## 2025-09-19 PROCEDURE — 93294 REM INTERROG EVL PM/LDLS PM: CPT

## 2025-09-19 PROCEDURE — 93296 REM INTERROG EVL PM/IDS: CPT
